# Patient Record
Sex: MALE | Race: WHITE | NOT HISPANIC OR LATINO
[De-identification: names, ages, dates, MRNs, and addresses within clinical notes are randomized per-mention and may not be internally consistent; named-entity substitution may affect disease eponyms.]

---

## 2017-09-14 ENCOUNTER — APPOINTMENT (OUTPATIENT)
Dept: CT IMAGING | Facility: CLINIC | Age: 62
End: 2017-09-14

## 2017-09-20 ENCOUNTER — OUTPATIENT (OUTPATIENT)
Dept: OUTPATIENT SERVICES | Facility: HOSPITAL | Age: 62
LOS: 1 days | End: 2017-09-20
Payer: COMMERCIAL

## 2017-09-20 ENCOUNTER — APPOINTMENT (OUTPATIENT)
Dept: RADIOLOGY | Facility: CLINIC | Age: 62
End: 2017-09-20
Payer: COMMERCIAL

## 2017-09-20 DIAGNOSIS — Z00.8 ENCOUNTER FOR OTHER GENERAL EXAMINATION: ICD-10-CM

## 2017-09-20 PROCEDURE — 27093 INJECTION FOR HIP X-RAY: CPT

## 2017-09-20 PROCEDURE — 27093 INJECTION FOR HIP X-RAY: CPT | Mod: LT

## 2017-09-20 PROCEDURE — 73525 CONTRAST X-RAY OF HIP: CPT | Mod: 26,LT

## 2017-09-20 PROCEDURE — 73525 CONTRAST X-RAY OF HIP: CPT

## 2018-09-13 ENCOUNTER — RESULT REVIEW (OUTPATIENT)
Age: 63
End: 2018-09-13

## 2019-01-07 ENCOUNTER — OUTPATIENT (OUTPATIENT)
Dept: OUTPATIENT SERVICES | Facility: HOSPITAL | Age: 64
LOS: 1 days | End: 2019-01-07
Payer: MEDICARE

## 2019-01-07 VITALS
HEIGHT: 70 IN | TEMPERATURE: 99 F | OXYGEN SATURATION: 98 % | RESPIRATION RATE: 16 BRPM | WEIGHT: 313.94 LBS | HEART RATE: 66 BPM | DIASTOLIC BLOOD PRESSURE: 84 MMHG | SYSTOLIC BLOOD PRESSURE: 138 MMHG

## 2019-01-07 DIAGNOSIS — G47.33 OBSTRUCTIVE SLEEP APNEA (ADULT) (PEDIATRIC): ICD-10-CM

## 2019-01-07 DIAGNOSIS — M71.341 OTHER BURSAL CYST, RIGHT HAND: ICD-10-CM

## 2019-01-07 DIAGNOSIS — T88.4XXA FAILED OR DIFFICULT INTUBATION, INITIAL ENCOUNTER: ICD-10-CM

## 2019-01-07 DIAGNOSIS — Z98.84 BARIATRIC SURGERY STATUS: Chronic | ICD-10-CM

## 2019-01-07 DIAGNOSIS — I10 ESSENTIAL (PRIMARY) HYPERTENSION: ICD-10-CM

## 2019-01-07 DIAGNOSIS — M71.30 OTHER BURSAL CYST, UNSPECIFIED SITE: ICD-10-CM

## 2019-01-07 DIAGNOSIS — Z98.890 OTHER SPECIFIED POSTPROCEDURAL STATES: Chronic | ICD-10-CM

## 2019-01-07 DIAGNOSIS — Z01.818 ENCOUNTER FOR OTHER PREPROCEDURAL EXAMINATION: ICD-10-CM

## 2019-01-07 LAB
ALBUMIN SERPL ELPH-MCNC: 3.5 G/DL — SIGNIFICANT CHANGE UP (ref 3.3–5)
ALP SERPL-CCNC: 83 U/L — SIGNIFICANT CHANGE UP (ref 40–120)
ALT FLD-CCNC: 35 U/L — SIGNIFICANT CHANGE UP (ref 12–78)
ANION GAP SERPL CALC-SCNC: 7 MMOL/L — SIGNIFICANT CHANGE UP (ref 5–17)
AST SERPL-CCNC: 24 U/L — SIGNIFICANT CHANGE UP (ref 15–37)
BILIRUB SERPL-MCNC: 1 MG/DL — SIGNIFICANT CHANGE UP (ref 0.2–1.2)
BUN SERPL-MCNC: 10 MG/DL — SIGNIFICANT CHANGE UP (ref 7–23)
CALCIUM SERPL-MCNC: 8.5 MG/DL — SIGNIFICANT CHANGE UP (ref 8.5–10.1)
CHLORIDE SERPL-SCNC: 104 MMOL/L — SIGNIFICANT CHANGE UP (ref 96–108)
CO2 SERPL-SCNC: 33 MMOL/L — HIGH (ref 22–31)
CREAT SERPL-MCNC: 0.86 MG/DL — SIGNIFICANT CHANGE UP (ref 0.5–1.3)
GLUCOSE SERPL-MCNC: 97 MG/DL — SIGNIFICANT CHANGE UP (ref 70–99)
HCT VFR BLD CALC: 49.6 % — SIGNIFICANT CHANGE UP (ref 39–50)
HGB BLD-MCNC: 16.2 G/DL — SIGNIFICANT CHANGE UP (ref 13–17)
MCHC RBC-ENTMCNC: 28.7 PG — SIGNIFICANT CHANGE UP (ref 27–34)
MCHC RBC-ENTMCNC: 32.7 GM/DL — SIGNIFICANT CHANGE UP (ref 32–36)
MCV RBC AUTO: 87.8 FL — SIGNIFICANT CHANGE UP (ref 80–100)
NRBC # BLD: 0 /100 WBCS — SIGNIFICANT CHANGE UP (ref 0–0)
PLATELET # BLD AUTO: 285 K/UL — SIGNIFICANT CHANGE UP (ref 150–400)
POTASSIUM SERPL-MCNC: 3.7 MMOL/L — SIGNIFICANT CHANGE UP (ref 3.5–5.3)
POTASSIUM SERPL-SCNC: 3.7 MMOL/L — SIGNIFICANT CHANGE UP (ref 3.5–5.3)
PROT SERPL-MCNC: 7.1 G/DL — SIGNIFICANT CHANGE UP (ref 6–8.3)
RBC # BLD: 5.65 M/UL — SIGNIFICANT CHANGE UP (ref 4.2–5.8)
RBC # FLD: 13.5 % — SIGNIFICANT CHANGE UP (ref 10.3–14.5)
SODIUM SERPL-SCNC: 144 MMOL/L — SIGNIFICANT CHANGE UP (ref 135–145)
WBC # BLD: 8.77 K/UL — SIGNIFICANT CHANGE UP (ref 3.8–10.5)
WBC # FLD AUTO: 8.77 K/UL — SIGNIFICANT CHANGE UP (ref 3.8–10.5)

## 2019-01-07 PROCEDURE — 93005 ELECTROCARDIOGRAM TRACING: CPT

## 2019-01-07 PROCEDURE — G0463: CPT

## 2019-01-07 PROCEDURE — 85027 COMPLETE CBC AUTOMATED: CPT

## 2019-01-07 PROCEDURE — 93010 ELECTROCARDIOGRAM REPORT: CPT | Mod: NC

## 2019-01-07 PROCEDURE — 36415 COLL VENOUS BLD VENIPUNCTURE: CPT

## 2019-01-07 PROCEDURE — 80053 COMPREHEN METABOLIC PANEL: CPT

## 2019-01-07 NOTE — H&P PST ADULT - ASSESSMENT
62 yo male scheduled for excision right index and middle finger mucous cyst w/ debridement of DIP joint biopsy of right middle fingernail lesion on 01/18/2019.

## 2019-01-07 NOTE — H&P PST ADULT - PMH
Bursal cyst  right hand  Chronic obstructive pulmonary disease, unspecified COPD type  stable- no exacerbation- Last pulmonary evaluation on 12/2018  Essential hypertension    Hypothyroidism, adult    Obesity  BMI 45  CELESTINO (obstructive sleep apnea)  CPAP advised - pt noncompliant Bursal cyst  right hand  Chronic obstructive pulmonary disease, unspecified COPD type  stable- no exacerbation- Last pulmonary evaluation on 12/2018  Difficult intubation    Essential hypertension    Hypothyroidism, adult    Obesity  BMI 45  CELESTINO (obstructive sleep apnea)  CPAP advised - pt noncompliant

## 2019-01-07 NOTE — H&P PST ADULT - PSH
History of nasal septoplasty  09/2018 History of laparoscopic adjustable gastric banding  Lap band- 2010  Deflated in 2015  History of nasal septoplasty  09/2018

## 2019-01-07 NOTE — PROGRESS NOTE ADULT - SUBJECTIVE AND OBJECTIVE BOX
Patient scheduled for elective procedure on two fingers by Dr. Maldonado.  Asked to evaluate secondary to possible difficult intubation.  Patient is morbidly obese patient, poor historian who describes two separate instances of failed procedure secondary to airway compromise, once during colonoscopy or possibly endoscopy and the second during a lap band procedure.  Patient also describes a secondary attempt at both procedures after what sounds like an inhalational intubation technique with some degree of IV sedation.  Patients anatomical exam reveals a Mallampati class 4 airway, short thick neck, poor dentition, small mouth opening, and big tongue.  Patient also has emphysema which may complicate intubation.  Recommend sedation and digital nerve block for this minor procedure or if intubation needed, then sedated fiberoptic intubation may be required.  Patient acknowledged understanding and agrees with anesthetic plan.

## 2019-01-07 NOTE — H&P PST ADULT - HISTORY OF PRESENT ILLNESS
64 yo male with h/o HTN, COPD, hypothyroidism, obesity c/o mucous cyst right index & middle finger x 6 months. Pt had surgical consult- scheduled for excision right index and middle finger mucous cyst w/ debridement of DIP joint biopsy of right middle fingernail lesion on 01/18/2019.    **Pt has s h/o difficult intun 62 yo male with h/o HTN, COPD, hypothyroidism, obesity c/o mucous cyst right index & middle finger x 6 months. Pt had surgical consult- scheduled for excision right index and middle finger mucous cyst w/ debridement of DIP joint biopsy of right middle fingernail lesion on 01/18/2019.    **Pt has s h/o difficult intubation, pt evaluated by Dr. Sosa in PST**

## 2019-01-07 NOTE — H&P PST ADULT - PROBLEM SELECTOR PLAN 1
excision right index and middle finger mucous cyst w/ debridement of DIP joint biopsy of right middle fingernail lesion   Labs- CBC, CMP, EKG  PMD evaluation on 01/10/2019 for M/C

## 2019-01-17 ENCOUNTER — TRANSCRIPTION ENCOUNTER (OUTPATIENT)
Age: 64
End: 2019-01-17

## 2019-01-18 ENCOUNTER — OUTPATIENT (OUTPATIENT)
Dept: OUTPATIENT SERVICES | Facility: HOSPITAL | Age: 64
LOS: 1 days | End: 2019-01-18
Payer: MEDICARE

## 2019-01-18 VITALS
TEMPERATURE: 98 F | RESPIRATION RATE: 16 BRPM | WEIGHT: 313.94 LBS | DIASTOLIC BLOOD PRESSURE: 79 MMHG | HEIGHT: 70 IN | SYSTOLIC BLOOD PRESSURE: 196 MMHG | OXYGEN SATURATION: 95 % | HEART RATE: 63 BPM

## 2019-01-18 VITALS
SYSTOLIC BLOOD PRESSURE: 132 MMHG | RESPIRATION RATE: 19 BRPM | DIASTOLIC BLOOD PRESSURE: 78 MMHG | HEART RATE: 79 BPM | OXYGEN SATURATION: 95 %

## 2019-01-18 DIAGNOSIS — Z01.818 ENCOUNTER FOR OTHER PREPROCEDURAL EXAMINATION: ICD-10-CM

## 2019-01-18 DIAGNOSIS — Z98.890 OTHER SPECIFIED POSTPROCEDURAL STATES: Chronic | ICD-10-CM

## 2019-01-18 DIAGNOSIS — M71.341 OTHER BURSAL CYST, RIGHT HAND: ICD-10-CM

## 2019-01-18 DIAGNOSIS — Z98.84 BARIATRIC SURGERY STATUS: Chronic | ICD-10-CM

## 2019-01-18 PROCEDURE — 26160 REMOVE TENDON SHEATH LESION: CPT | Mod: F8

## 2019-01-18 RX ORDER — CEFAZOLIN SODIUM 1 G
3000 VIAL (EA) INJECTION ONCE
Qty: 0 | Refills: 0 | Status: COMPLETED | OUTPATIENT
Start: 2019-01-18 | End: 2019-01-18

## 2019-01-18 RX ORDER — OXYCODONE HYDROCHLORIDE 5 MG/1
5 TABLET ORAL ONCE
Qty: 0 | Refills: 0 | Status: DISCONTINUED | OUTPATIENT
Start: 2019-01-18 | End: 2019-01-18

## 2019-01-18 RX ORDER — KETOROLAC TROMETHAMINE 30 MG/ML
15 SYRINGE (ML) INJECTION ONCE
Qty: 0 | Refills: 0 | Status: DISCONTINUED | OUTPATIENT
Start: 2019-01-18 | End: 2019-01-18

## 2019-01-18 RX ORDER — METOCLOPRAMIDE HCL 10 MG
5 TABLET ORAL ONCE
Qty: 0 | Refills: 0 | Status: DISCONTINUED | OUTPATIENT
Start: 2019-01-18 | End: 2019-01-18

## 2019-01-18 RX ORDER — ACETAMINOPHEN 500 MG
650 TABLET ORAL ONCE
Qty: 0 | Refills: 0 | Status: COMPLETED | OUTPATIENT
Start: 2019-01-18 | End: 2019-01-18

## 2019-01-18 RX ORDER — SODIUM CHLORIDE 9 MG/ML
1000 INJECTION, SOLUTION INTRAVENOUS
Qty: 0 | Refills: 0 | Status: DISCONTINUED | OUTPATIENT
Start: 2019-01-18 | End: 2019-01-18

## 2019-01-18 RX ORDER — HYDROMORPHONE HYDROCHLORIDE 2 MG/ML
0.5 INJECTION INTRAMUSCULAR; INTRAVENOUS; SUBCUTANEOUS
Qty: 0 | Refills: 0 | Status: DISCONTINUED | OUTPATIENT
Start: 2019-01-18 | End: 2019-01-18

## 2019-01-18 RX ADMIN — SODIUM CHLORIDE 40 MILLILITER(S): 9 INJECTION, SOLUTION INTRAVENOUS at 09:05

## 2019-01-18 RX ADMIN — Medication 15 MILLIGRAM(S): at 13:45

## 2019-01-18 RX ADMIN — Medication 650 MILLIGRAM(S): at 13:45

## 2019-01-18 RX ADMIN — SODIUM CHLORIDE 75 MILLILITER(S): 9 INJECTION, SOLUTION INTRAVENOUS at 13:31

## 2019-01-18 RX ADMIN — Medication 15 MILLIGRAM(S): at 13:33

## 2019-01-18 NOTE — ASU PATIENT PROFILE, ADULT - PSH
History of laparoscopic adjustable gastric banding  Lap band- 2010  Deflated in 2015  History of nasal septoplasty  09/2018

## 2019-01-18 NOTE — ASU DISCHARGE PLAN (ADULT/PEDIATRIC). - MEDICATION SUMMARY - MEDICATIONS TO TAKE
I will START or STAY ON the medications listed below when I get home from the hospital:    eplerenone 25 mg oral tablet  -- 1 tab(s) by mouth once a day  -- Indication: For blood pressure    quinapril 40 mg oral tablet  -- 1 tab(s) by mouth once a day  -- Indication: For blood pressure    atenolol 50 mg oral tablet  -- 1 tab(s) by mouth once a day  -- Indication: For blood pressure    amLODIPine 5 mg oral tablet  -- 1 tab(s) by mouth once a day  -- Indication: For blood pressure    Lasix 40 mg oral tablet  -- 1 tab(s) by mouth once a day  -- Indication: For blood pressure    Colace 100 mg oral capsule  -- 1  by mouth once a day  -- Indication: For laxative    Synthroid 200 mcg (0.2 mg) oral tablet  -- 1 tab(s) by mouth once a day  -- Indication: For hypothyroidism    Multiple Vitamins oral tablet  -- 1 tab(s) by mouth once a day last dose on 01/11/2019  -- Indication: For supplement

## 2019-01-18 NOTE — ASU DISCHARGE PLAN (ADULT/PEDIATRIC). - FOLLOWUP APPOINTMENT CLINIC/PHYSICIAN
Please follow up with Dr. Maldonado in the office next week.  If you do not already have an appointment, call the office to schedule one. [phone]

## 2019-01-18 NOTE — BRIEF OPERATIVE NOTE - PROCEDURE
<<-----Click on this checkbox to enter Procedure Debridement  01/18/2019  right index and middle finger DIP joints  Active  AMNA  Excision of mucous cyst of right index finger  01/18/2019    Active  AMNA  Excision of mucous cyst of right middle finger  01/18/2019    Active  AMAN

## 2019-01-18 NOTE — ASU PATIENT PROFILE, ADULT - PMH
Bursal cyst  right hand  Chronic obstructive pulmonary disease, unspecified COPD type  stable- no exacerbation- Last pulmonary evaluation on 12/2018  Difficult intubation    Essential hypertension    Hypothyroidism, adult    Obesity  BMI 45  CELESTINO (obstructive sleep apnea)  CPAP advised - pt noncompliant

## 2019-01-18 NOTE — ASU DISCHARGE PLAN (ADULT/PEDIATRIC). - NOTIFY
Fever greater than 101/Numbness, tingling/Bleeding that does not stop/Numbness, color, or temperature change to extremity/Pain not relieved by Medications Swelling that continues/Fever greater than 101/Numbness, tingling/Bleeding that does not stop/Numbness, color, or temperature change to extremity/Pain not relieved by Medications

## 2019-01-20 PROBLEM — J44.9 CHRONIC OBSTRUCTIVE PULMONARY DISEASE, UNSPECIFIED: Chronic | Status: ACTIVE | Noted: 2019-01-07

## 2019-01-20 PROBLEM — I10 ESSENTIAL (PRIMARY) HYPERTENSION: Chronic | Status: ACTIVE | Noted: 2019-01-07

## 2019-01-20 PROBLEM — T88.4XXA FAILED OR DIFFICULT INTUBATION, INITIAL ENCOUNTER: Chronic | Status: ACTIVE | Noted: 2019-01-07

## 2019-01-20 PROBLEM — M71.30 OTHER BURSAL CYST, UNSPECIFIED SITE: Chronic | Status: ACTIVE | Noted: 2019-01-07

## 2019-01-20 PROBLEM — E66.9 OBESITY, UNSPECIFIED: Chronic | Status: ACTIVE | Noted: 2019-01-07

## 2019-01-20 PROBLEM — E03.9 HYPOTHYROIDISM, UNSPECIFIED: Chronic | Status: ACTIVE | Noted: 2019-01-07

## 2019-01-20 PROBLEM — G47.33 OBSTRUCTIVE SLEEP APNEA (ADULT) (PEDIATRIC): Chronic | Status: ACTIVE | Noted: 2019-01-07

## 2019-03-04 ENCOUNTER — TRANSCRIPTION ENCOUNTER (OUTPATIENT)
Age: 64
End: 2019-03-04

## 2019-03-05 ENCOUNTER — RESULT REVIEW (OUTPATIENT)
Age: 64
End: 2019-03-05

## 2019-03-05 ENCOUNTER — OUTPATIENT (OUTPATIENT)
Dept: OUTPATIENT SERVICES | Facility: HOSPITAL | Age: 64
LOS: 1 days | End: 2019-03-05
Payer: MEDICARE

## 2019-03-05 DIAGNOSIS — Z12.11 ENCOUNTER FOR SCREENING FOR MALIGNANT NEOPLASM OF COLON: ICD-10-CM

## 2019-03-05 DIAGNOSIS — Z98.890 OTHER SPECIFIED POSTPROCEDURAL STATES: Chronic | ICD-10-CM

## 2019-03-05 DIAGNOSIS — Z98.84 BARIATRIC SURGERY STATUS: Chronic | ICD-10-CM

## 2019-03-05 PROCEDURE — 45385 COLONOSCOPY W/LESION REMOVAL: CPT | Mod: PT

## 2019-03-05 PROCEDURE — 88305 TISSUE EXAM BY PATHOLOGIST: CPT | Mod: 26

## 2019-03-05 PROCEDURE — 88305 TISSUE EXAM BY PATHOLOGIST: CPT

## 2019-03-06 LAB — SURGICAL PATHOLOGY STUDY: SIGNIFICANT CHANGE UP

## 2019-04-26 NOTE — H&P PST ADULT - MALLAMPATI CLASS
Verbal order and read back per PT INR HV CSI  Patient is within therapeutic range  continue Coumadin 5mg daily except 2.5mg on Tues, Thur, and Sat   Recheck 4 weeks  This has been fully explained to the patient, who indicates understanding. Class III - visualization of the soft palate and the base of the uvula Class IV (difficult) - the soft palate is not visible at all

## 2019-05-07 NOTE — ASU DISCHARGE PLAN (ADULT/PEDIATRIC). - BATHING
You may shower, but keep right hand dry Alert-The patient is alert, awake and responds to voice. The patient is oriented to time, place, and person. The triage nurse is able to obtain subjective information.

## 2019-10-08 ENCOUNTER — APPOINTMENT (OUTPATIENT)
Dept: CT IMAGING | Facility: IMAGING CENTER | Age: 64
End: 2019-10-08
Payer: MEDICARE

## 2019-10-08 ENCOUNTER — OUTPATIENT (OUTPATIENT)
Dept: OUTPATIENT SERVICES | Facility: HOSPITAL | Age: 64
LOS: 1 days | End: 2019-10-08
Payer: MEDICARE

## 2019-10-08 DIAGNOSIS — Z00.8 ENCOUNTER FOR OTHER GENERAL EXAMINATION: ICD-10-CM

## 2019-10-08 DIAGNOSIS — Z98.890 OTHER SPECIFIED POSTPROCEDURAL STATES: Chronic | ICD-10-CM

## 2019-10-08 DIAGNOSIS — Z98.84 BARIATRIC SURGERY STATUS: Chronic | ICD-10-CM

## 2019-10-08 PROCEDURE — 71250 CT THORAX DX C-: CPT

## 2019-10-08 PROCEDURE — 71250 CT THORAX DX C-: CPT | Mod: 26

## 2021-08-12 ENCOUNTER — EMERGENCY (EMERGENCY)
Facility: HOSPITAL | Age: 66
LOS: 1 days | Discharge: HOME | End: 2021-08-12
Attending: EMERGENCY MEDICINE | Admitting: EMERGENCY MEDICINE
Payer: MEDICARE

## 2021-08-12 VITALS
DIASTOLIC BLOOD PRESSURE: 82 MMHG | SYSTOLIC BLOOD PRESSURE: 126 MMHG | OXYGEN SATURATION: 95 % | RESPIRATION RATE: 15 BRPM | TEMPERATURE: 98 F | HEART RATE: 77 BPM

## 2021-08-12 VITALS
WEIGHT: 309.97 LBS | OXYGEN SATURATION: 97 % | DIASTOLIC BLOOD PRESSURE: 78 MMHG | TEMPERATURE: 98 F | SYSTOLIC BLOOD PRESSURE: 132 MMHG | HEART RATE: 63 BPM | HEIGHT: 70 IN | RESPIRATION RATE: 16 BRPM

## 2021-08-12 DIAGNOSIS — Z98.890 OTHER SPECIFIED POSTPROCEDURAL STATES: Chronic | ICD-10-CM

## 2021-08-12 DIAGNOSIS — Z98.84 BARIATRIC SURGERY STATUS: Chronic | ICD-10-CM

## 2021-08-12 LAB
ALBUMIN SERPL ELPH-MCNC: 3.5 G/DL — SIGNIFICANT CHANGE UP (ref 3.3–5)
ALP SERPL-CCNC: 78 U/L — SIGNIFICANT CHANGE UP (ref 40–120)
ALT FLD-CCNC: 33 U/L — SIGNIFICANT CHANGE UP (ref 12–78)
ANION GAP SERPL CALC-SCNC: 6 MMOL/L — SIGNIFICANT CHANGE UP (ref 5–17)
AST SERPL-CCNC: 29 U/L — SIGNIFICANT CHANGE UP (ref 15–37)
BASOPHILS # BLD AUTO: 0.07 K/UL — SIGNIFICANT CHANGE UP (ref 0–0.2)
BASOPHILS NFR BLD AUTO: 0.7 % — SIGNIFICANT CHANGE UP (ref 0–2)
BILIRUB SERPL-MCNC: 1 MG/DL — SIGNIFICANT CHANGE UP (ref 0.2–1.2)
BUN SERPL-MCNC: 30 MG/DL — HIGH (ref 7–23)
CALCIUM SERPL-MCNC: 9.5 MG/DL — SIGNIFICANT CHANGE UP (ref 8.5–10.1)
CHLORIDE SERPL-SCNC: 99 MMOL/L — SIGNIFICANT CHANGE UP (ref 96–108)
CK MB CFR SERPL CALC: <1 NG/ML — SIGNIFICANT CHANGE UP (ref 0–3.6)
CO2 SERPL-SCNC: 36 MMOL/L — HIGH (ref 22–31)
CREAT SERPL-MCNC: 1.4 MG/DL — HIGH (ref 0.5–1.3)
EOSINOPHIL # BLD AUTO: 0.33 K/UL — SIGNIFICANT CHANGE UP (ref 0–0.5)
EOSINOPHIL NFR BLD AUTO: 3.1 % — SIGNIFICANT CHANGE UP (ref 0–6)
GLUCOSE SERPL-MCNC: 94 MG/DL — SIGNIFICANT CHANGE UP (ref 70–99)
HCT VFR BLD CALC: 47.8 % — SIGNIFICANT CHANGE UP (ref 39–50)
HGB BLD-MCNC: 16.3 G/DL — SIGNIFICANT CHANGE UP (ref 13–17)
IMM GRANULOCYTES NFR BLD AUTO: 0.6 % — SIGNIFICANT CHANGE UP (ref 0–1.5)
LYMPHOCYTES # BLD AUTO: 0.92 K/UL — LOW (ref 1–3.3)
LYMPHOCYTES # BLD AUTO: 8.7 % — LOW (ref 13–44)
MAGNESIUM SERPL-MCNC: 2.2 MG/DL — SIGNIFICANT CHANGE UP (ref 1.6–2.6)
MCHC RBC-ENTMCNC: 30 PG — SIGNIFICANT CHANGE UP (ref 27–34)
MCHC RBC-ENTMCNC: 34.1 GM/DL — SIGNIFICANT CHANGE UP (ref 32–36)
MCV RBC AUTO: 87.9 FL — SIGNIFICANT CHANGE UP (ref 80–100)
MONOCYTES # BLD AUTO: 1.15 K/UL — HIGH (ref 0–0.9)
MONOCYTES NFR BLD AUTO: 10.8 % — SIGNIFICANT CHANGE UP (ref 2–14)
NEUTROPHILS # BLD AUTO: 8.1 K/UL — HIGH (ref 1.8–7.4)
NEUTROPHILS NFR BLD AUTO: 76.1 % — SIGNIFICANT CHANGE UP (ref 43–77)
NRBC # BLD: 0 /100 WBCS — SIGNIFICANT CHANGE UP (ref 0–0)
NT-PROBNP SERPL-SCNC: 67 PG/ML — SIGNIFICANT CHANGE UP (ref 0–125)
PLATELET # BLD AUTO: 227 K/UL — SIGNIFICANT CHANGE UP (ref 150–400)
POTASSIUM SERPL-MCNC: 3.8 MMOL/L — SIGNIFICANT CHANGE UP (ref 3.5–5.3)
POTASSIUM SERPL-SCNC: 3.8 MMOL/L — SIGNIFICANT CHANGE UP (ref 3.5–5.3)
PROT SERPL-MCNC: 7.5 G/DL — SIGNIFICANT CHANGE UP (ref 6–8.3)
RBC # BLD: 5.44 M/UL — SIGNIFICANT CHANGE UP (ref 4.2–5.8)
RBC # FLD: 13 % — SIGNIFICANT CHANGE UP (ref 10.3–14.5)
SODIUM SERPL-SCNC: 141 MMOL/L — SIGNIFICANT CHANGE UP (ref 135–145)
TROPONIN I SERPL-MCNC: <.015 NG/ML — SIGNIFICANT CHANGE UP (ref 0.01–0.04)
WBC # BLD: 10.63 K/UL — HIGH (ref 3.8–10.5)
WBC # FLD AUTO: 10.63 K/UL — HIGH (ref 3.8–10.5)

## 2021-08-12 PROCEDURE — 80053 COMPREHEN METABOLIC PANEL: CPT

## 2021-08-12 PROCEDURE — 83735 ASSAY OF MAGNESIUM: CPT

## 2021-08-12 PROCEDURE — 93010 ELECTROCARDIOGRAM REPORT: CPT

## 2021-08-12 PROCEDURE — 83880 ASSAY OF NATRIURETIC PEPTIDE: CPT

## 2021-08-12 PROCEDURE — 84484 ASSAY OF TROPONIN QUANT: CPT

## 2021-08-12 PROCEDURE — 82553 CREATINE MB FRACTION: CPT

## 2021-08-12 PROCEDURE — 36415 COLL VENOUS BLD VENIPUNCTURE: CPT

## 2021-08-12 PROCEDURE — 93005 ELECTROCARDIOGRAM TRACING: CPT

## 2021-08-12 PROCEDURE — 70450 CT HEAD/BRAIN W/O DYE: CPT | Mod: MA

## 2021-08-12 PROCEDURE — 96361 HYDRATE IV INFUSION ADD-ON: CPT

## 2021-08-12 PROCEDURE — 96360 HYDRATION IV INFUSION INIT: CPT

## 2021-08-12 PROCEDURE — 70450 CT HEAD/BRAIN W/O DYE: CPT | Mod: 26,MA

## 2021-08-12 PROCEDURE — 85025 COMPLETE CBC W/AUTO DIFF WBC: CPT

## 2021-08-12 PROCEDURE — 99284 EMERGENCY DEPT VISIT MOD MDM: CPT | Mod: 25

## 2021-08-12 PROCEDURE — 99285 EMERGENCY DEPT VISIT HI MDM: CPT | Mod: GC

## 2021-08-12 RX ORDER — AMLODIPINE BESYLATE 2.5 MG/1
1 TABLET ORAL
Qty: 0 | Refills: 0 | DISCHARGE

## 2021-08-12 RX ORDER — ATENOLOL 25 MG/1
1 TABLET ORAL
Qty: 0 | Refills: 0 | DISCHARGE

## 2021-08-12 RX ORDER — SODIUM CHLORIDE 9 MG/ML
1000 INJECTION INTRAMUSCULAR; INTRAVENOUS; SUBCUTANEOUS ONCE
Refills: 0 | Status: COMPLETED | OUTPATIENT
Start: 2021-08-12 | End: 2021-08-12

## 2021-08-12 RX ORDER — FUROSEMIDE 40 MG
1 TABLET ORAL
Qty: 0 | Refills: 0 | DISCHARGE

## 2021-08-12 RX ADMIN — SODIUM CHLORIDE 1000 MILLILITER(S): 9 INJECTION INTRAMUSCULAR; INTRAVENOUS; SUBCUTANEOUS at 19:23

## 2021-08-12 RX ADMIN — SODIUM CHLORIDE 1000 MILLILITER(S): 9 INJECTION INTRAMUSCULAR; INTRAVENOUS; SUBCUTANEOUS at 21:00

## 2021-08-12 NOTE — ED ADULT NURSE NOTE - NSICDXPASTMEDICALHX_GEN_ALL_CORE_FT
PAST MEDICAL HISTORY:  Bursal cyst right hand    Chronic obstructive pulmonary disease, unspecified COPD type stable- no exacerbation- Last pulmonary evaluation on 12/2018    Difficult intubation     Essential hypertension     Hypothyroidism, adult     Obesity BMI 45    CELESTINO (obstructive sleep apnea) CPAP advised - pt noncompliant

## 2021-08-12 NOTE — ED ADULT NURSE NOTE - OBJECTIVE STATEMENT
Witnessed Syncope at home with Spouse, reports that Pt had not eaten or drank anything all day, passed out in kitchen, pt reports having BP issues and was started on new medication recently, skin intact, EKG WNL, pedal edema +2 noted positive pedal pulses, Spouse reports that pt hit his head when he fell, no abrasions, safety maintained will continue to monitor

## 2021-08-12 NOTE — ED PROVIDER NOTE - NSFOLLOWUPINSTRUCTIONS_ED_ALL_ED_FT
Follow up with your primary care doctor and cardiologist.        WHAT YOU NEED TO KNOW:    Syncope is also called fainting or passing out. Syncope is a sudden, temporary loss of consciousness, followed by a fall from a standing or sitting position. Syncope ranges from not serious to a sign of a more serious condition that needs to be treated. You can control some health conditions that cause syncope. Your healthcare providers can help you create a plan to manage syncope and prevent episodes.    DISCHARGE INSTRUCTIONS:    Seek care immediately if:   •You are bleeding because you hit your head when you fainted.       •You suddenly have double vision, difficulty speaking, numbness, and cannot move your arms or legs.      •You have chest pain and trouble breathing.      •You vomit blood or material that looks like coffee grounds.      •You see blood in your bowel movement.      Contact your healthcare provider if:   •You have new or worsening symptoms.      •You have another syncope episode.      •You have a headache, fast heartbeat, or feel too dizzy to stand up.      •You have questions or concerns about your condition or care.      Medicines:   •Medicines may be needed to help your heart pump strongly and regularly. Your healthcare provider may also make changes to any medicines that are causing syncope.       •Take your medicine as directed. Contact your healthcare provider if you think your medicine is not helping or if you have side effects. Tell him or her if you are allergic to any medicine. Keep a list of the medicines, vitamins, and herbs you take. Include the amounts, and when and why you take them. Bring the list or the pill bottles to follow-up visits. Carry your medicine list with you in case of an emergency.      Follow up with your doctor as directed: Write down your questions so you remember to ask them during your visits.     Manage syncope:   •Keep a record of your syncope episodes. Include your symptoms and your activity before and after the episode. The record can help your healthcare provider find the cause of your syncope and help you manage episodes.      •Sit or lie down when needed. This includes when you feel dizzy, your throat is getting tight, and your vision changes. Raise your legs above the level of your heart.      •Take slow, deep breaths if you start to breathe faster with anxiety or fear. This can help decrease dizziness and the feeling that you might faint.       •Check your blood pressure often. This is important if you take medicine to lower your blood pressure. Check your blood pressure when you are lying down and when you are standing. Ask how often to check during the day. Keep a record of your blood pressure numbers. Your healthcare provider may use the record to help plan your treatment.  How to take a Blood Pressure           Prevent a syncope episode:   •Move slowly and let yourself get used to one position before you move to another position. This is very important when you change from a lying or sitting position to a standing position. Take some deep breaths before you stand up from a lying position. Stand up slowly. Sudden movements may cause a fainting spell. Sit on the side of the bed or couch for a few minutes before you stand up. If you are on bedrest, try to be upright for about 2 hours each day, or as directed. Do not lock your legs if you are standing for a long period of time. Move your legs and bend your knees to keep blood flowing.      •Follow your healthcare provider's recommendations. Your provider may recommend that you drink more liquids to prevent dehydration. You may also need to have more salt to keep your blood pressure from dropping too low and causing syncope. Your provider will tell you how much liquid and sodium to have each day. He or she will also tell you how much physical activity is safe for you. This will depend on what is causing your syncope.      •Watch for signs of low blood sugar. These include hunger, nervousness, sweating, and fast or fluttery heartbeats. Talk with your healthcare provider about ways to keep your blood sugar level steady.      •Do not strain if you are constipated. You may faint if you strain to have a bowel movement. Walking is the best way to get your bowels moving. Eat foods high in fiber to make it easier to have a bowel movement. Good examples are high-fiber cereals, beans, vegetables, and whole-grain breads. Prune juice may help make bowel movements softer.      •Be careful in hot weather. Heat can cause a syncope episode. Limit activity done outside on hot days. Physical activity in hot weather can lead to dehydration. This can cause an episode.         © Copyright [employer] 2021           back to top                          © Copyright [employer] 2021

## 2021-08-12 NOTE — ED PROVIDER NOTE - PROGRESS NOTE DETAILS
patient feeling well, ambulatory with out assitance, ekg, labs, ct head, reviwed, noted elevation of BUN and creatnine, to dc home, will f/u with PMD and cardiologist

## 2021-08-12 NOTE — ED PROVIDER NOTE - ATTENDING CONTRIBUTION TO CARE
66 male presents to ER for syncope, patient on metalozone, lasix, epreline for lower extremity edema, has not eaten or hydrated himself today, felt light headed and syncopized, hit head, patient alert and oriented, no acute distress, noted bilateral lower extremity edema, patient states his swelling has improved, heart and lungs clear, abdomen soft, f/u ct head, labs, ekg, re-eval.

## 2021-08-12 NOTE — ED ADULT TRIAGE NOTE - CHIEF COMPLAINT QUOTE
pt passed out for a second at home  was dizzy and lightheaded at time of event  denies dizziness or lightheadedness now, per wife the drs been changing blood pressure meds

## 2021-08-12 NOTE — ED PROVIDER NOTE - OBJECTIVE STATEMENT
Patient is a 65 yo male with PMH of HTN, COPD, obesity, CELESTINO, hypothyroidism. Presents to the ED after an episode of dizziness and syncope. Patient was at the kitchen when he felt dizzy and pass out. Her wife was around when heard the fall and was able to assist him. He was on the floor initially confused, but alert and was able to stand by himself with no symptoms or restrictions. After the episode, he has been asymptomatic. Patient has been for 12 days on new medication to control the BP - metolazone 5 mg QD- and does not check the BP at home. Denies chest pain, palpitations, chest pressure, headache, blurry vision, before or after the episode.   Vaccinated with Moderna x2 (last one 03/21). Patient is a 67 yo male with PMH of HTN, COPD, obesity, CELESTINO, hypothyroidism. Presents to the ED after an episode of syncope. Patient took his meds at 10 am and then was outside walking with his dog, doing some home shores and did not drink any fluids or eat any food during the day. In the afternoon, about 3pm he was standing at the kitchen when he felt lightheaded and pass out. Her wife was around when heard the fall and was able to assist him. He was on the floor initially confused, but alert and was able to stand by himself with no symptoms or restrictions for movement. He had a trauma in the right side of the head during the fall with no bleeding.  After the episode, he has been asymptomatic. Patient has been for 12 days on new medication to control the BP - metolazone 5 mg QD- and has not checked the BP at home. Denies chest pain, palpitations, chest pressure, headache, blurry vision, before or after the episode.   Vaccinated with Moderna x2 (last one 03/21).

## 2021-08-12 NOTE — ED ADULT NURSE NOTE - NSICDXPASTSURGICALHX_GEN_ALL_CORE_FT
PAST SURGICAL HISTORY:  History of laparoscopic adjustable gastric banding Lap band- 2010  Deflated in 2015    History of nasal septoplasty 09/2018

## 2021-08-12 NOTE — ED ADULT NURSE NOTE - NSIMPLEMENTINTERV_GEN_ALL_ED
Implemented All Fall with Harm Risk Interventions:  [place] to call system. Call bell, personal items and telephone within reach. Instruct patient to call for assistance. Room bathroom lighting operational. Non-slip footwear when patient is off stretcher. Physically safe environment: no spills, clutter or unnecessary equipment. Stretcher in lowest position, wheels locked, appropriate side rails in place. Provide visual cue, wrist band, yellow gown, etc. Monitor gait and stability. Monitor for mental status changes and reorient to person, place, and time. Review medications for side effects contributing to fall risk. Reinforce activity limits and safety measures with patient and family. Provide visual clues: red socks.

## 2021-08-12 NOTE — ED PROVIDER NOTE - CARE PROVIDER_API CALL
Hema Milner  INTERNAL MEDICINE  [address], [address]  [place] [address]  Phone: [phone]  Fax: [phone]  Follow Up Time:     Geovany Hodges)  Cardiovascular Disease  [address] [place] [address]  Phone: [phone]  Fax: [phone]  Follow Up Time:

## 2021-08-12 NOTE — ED PROVIDER NOTE - PATIENT PORTAL LINK FT
You can access the FollowMyHealth Patient Portal offered by [facility] by registering at the following website: [email or web address]. By joining [facility]’s FollowMyHealth portal, you will also be able to view your health information using other applications (apps) compatible with our system.

## 2022-04-04 ENCOUNTER — APPOINTMENT (OUTPATIENT)
Dept: ORTHOPEDIC SURGERY | Facility: CLINIC | Age: 67
End: 2022-04-04
Payer: MEDICARE

## 2022-04-04 PROCEDURE — 20611 DRAIN/INJ JOINT/BURSA W/US: CPT | Mod: LT

## 2022-04-11 ENCOUNTER — APPOINTMENT (OUTPATIENT)
Dept: ORTHOPEDIC SURGERY | Facility: CLINIC | Age: 67
End: 2022-04-11
Payer: MEDICARE

## 2022-04-11 PROCEDURE — 20610 DRAIN/INJ JOINT/BURSA W/O US: CPT

## 2022-04-11 NOTE — REASON FOR VISIT
[FreeTextEntry2] : 4/11/22: Orthovisc #4 left knee\par 4/4/22: Orthovisc #3 left knee\par 3/28/22- for continued orthovisc injections to the left knee\par 2/21/22: Patient presents for worsening L posterior knee pain after passing out in the kitchen 1 month ago. described as a dull ache. Denies associated swelling. Aggravated by walking, twisting motions, or prolonged standing. Slightly alleviated by Advil and heating pads. denies radiation. Denies prior injuries/surgeries/treatments for L knee. Denies associated clicking or giving out. Patient had a doppler US done on 2/16 as a routine surgery f/u, where he was told he may have a Baker's cyst. Denies calf pain.\par \par PMH: HTN. Denies DM, high cholesterol, or CA Hx\par \par PMH: retired gov

## 2022-04-11 NOTE — PROCEDURE
[Large Joint Injection] : Large joint injection [Knee] : knee [Left] : of the left [X-ray evidence of Osteoarthritis on this or prior visit] : x-ray evidence of Osteoarthritis on this or prior visit [Alcohol] : alcohol [Betadine] : betadine [Ethyl Chloride sprayed topically] : ethyl chloride sprayed topically [Orthovisc] : Orthovisc [#4] : series #4 [Call if redness, pain or fever occur] : call if redness, pain or fever occur [Apply ice for 15min out of every hour for the next 12-24 hours as tolerated] : apply ice for 15 minutes out of every hour for the next 12-24 hours as tolerated [Patient was advised to rest the joint(s) for ____ days] : patient was advised to rest the joint(s) for [unfilled] days [Previous OTC use and PT nontherapeutic] : patient has tried OTC's including aspirin, Ibuprofen, Aleve, etc or prescription NSAIDS, and/or exercises at home and/or physical therapy without satisfactory response [Patient had decreased mobility in the joint] : patient had decreased mobility in the joint [Risks, benefits, alternatives discussed / Verbal consent obtained] : the risks benefits, and alternatives have been discussed, and verbal consent was obtained

## 2022-04-12 ENCOUNTER — APPOINTMENT (OUTPATIENT)
Dept: ELECTROPHYSIOLOGY | Facility: CLINIC | Age: 67
End: 2022-04-12
Payer: MEDICARE

## 2022-04-12 ENCOUNTER — NON-APPOINTMENT (OUTPATIENT)
Age: 67
End: 2022-04-12

## 2022-04-12 VITALS
DIASTOLIC BLOOD PRESSURE: 80 MMHG | HEART RATE: 78 BPM | SYSTOLIC BLOOD PRESSURE: 132 MMHG | OXYGEN SATURATION: 94 % | HEIGHT: 70 IN | BODY MASS INDEX: 44.09 KG/M2 | WEIGHT: 308 LBS

## 2022-04-12 DIAGNOSIS — Z86.79 PERSONAL HISTORY OF OTHER DISEASES OF THE CIRCULATORY SYSTEM: ICD-10-CM

## 2022-04-12 DIAGNOSIS — Z78.9 OTHER SPECIFIED HEALTH STATUS: ICD-10-CM

## 2022-04-12 DIAGNOSIS — R00.2 PALPITATIONS: ICD-10-CM

## 2022-04-12 DIAGNOSIS — R55 SYNCOPE AND COLLAPSE: ICD-10-CM

## 2022-04-12 PROCEDURE — 99204 OFFICE O/P NEW MOD 45 MIN: CPT

## 2022-04-12 PROCEDURE — 93000 ELECTROCARDIOGRAM COMPLETE: CPT

## 2022-04-12 RX ORDER — DOXAZOSIN 8 MG/1
8 TABLET ORAL
Qty: 90 | Refills: 0 | Status: ACTIVE | COMMUNITY
Start: 2021-06-24

## 2022-04-14 ENCOUNTER — NON-APPOINTMENT (OUTPATIENT)
Age: 67
End: 2022-04-14

## 2022-04-16 PROBLEM — R55 SYNCOPE AND COLLAPSE: Status: RESOLVED | Noted: 2022-04-16 | Resolved: 2022-04-16

## 2022-04-16 PROBLEM — Z78.9 NO FAMILY HISTORY OF SUDDEN DEATH: Status: ACTIVE | Noted: 2022-04-16

## 2022-04-16 PROBLEM — Z86.79 HISTORY OF HYPERTENSION: Status: RESOLVED | Noted: 2022-04-16 | Resolved: 2022-04-16

## 2022-04-16 PROBLEM — Z78.9 NON-SMOKER: Status: ACTIVE | Noted: 2022-04-16

## 2022-04-16 RX ORDER — EPLERENONE 50 MG/1
50 TABLET, COATED ORAL DAILY
Refills: 0 | Status: ACTIVE | COMMUNITY
Start: 2022-04-16

## 2022-04-16 RX ORDER — POTASSIUM CHLORIDE 750 MG/1
10 TABLET, EXTENDED RELEASE ORAL DAILY
Refills: 0 | Status: ACTIVE | COMMUNITY
Start: 2022-04-16

## 2022-04-16 NOTE — PHYSICAL EXAM
[Well Nourished] : well nourished [No Acute Distress] : no acute distress [Obese] : obese [Normal Conjunctiva] : normal conjunctiva [Normal Venous Pressure] : normal venous pressure [Normal S1, S2] : normal S1, S2 [No Murmur] : no murmur [No Rub] : no rub [No Gallop] : no gallop [Clear Lung Fields] : clear lung fields [Good Air Entry] : good air entry [No Respiratory Distress] : no respiratory distress  [Soft] : abdomen soft [Non Tender] : non-tender [Normal Bowel Sounds] : normal bowel sounds [Normal Gait] : normal gait [No Edema] : no edema [No Cyanosis] : no cyanosis [No Rash] : no rash [Moves all extremities] : moves all extremities [No Focal Deficits] : no focal deficits [Normal Speech] : normal speech [Alert and Oriented] : alert and oriented [Normal memory] : normal memory

## 2022-04-18 NOTE — HISTORY OF PRESENT ILLNESS
[FreeTextEntry1] : I had the pleasure of seeing Asael Brandt today for consultation for pacemaker evaluation in the arrhythmia clinic at [facility]. As you well know, he is a pleasant 67 year old gentleman with a history of hypertension, hyperlipidemia, obesity, baseline right bundle branch block with left axis, syncope/near syncope and event monitor showing multiple long pauses up to 6.4 seconds. Patient reports that several months ago he was in his kitchen. He was standing when he suddenly syncopized. His wife heard a noise and went to kitchen and saw he was on the floor. By the time she was went to the kitchen he was already regaining consciousness. He denies any prodrome and denies chest pain, palpitations, diaphoresis, or shortness of breath. At the hospital, he was told he was dehydrated and work up at the hospital was negative. Subsequently since then he has been having episodes described as "zoning out". His wife states that at times he would appear to space out very briefly. She was call his name and he would not respond for a few seconds, and then all of a sudden he would shutter and become alert. Patient states that during those times he has a odd feeling when he spaces out and then suddenly comes to. He had a MCOT monitor placed which showed multipel long pauses over 6 seconds with longest about 6.4 seconds. These pauses occurred mainly during the day. As per the patient and his wife, he was not sleeping during many of these long pauses as they were able to correlate what they were doing at the time. However, they could not recall if he had symptoms during those events. He was on atenolol but this was discontinued based on the MCOT findings.\par

## 2022-04-18 NOTE — DISCUSSION/SUMMARY
[FreeTextEntry1] : In summary, Mr. Brandt is a 67 year old gentleman with a history of hypertension, hyperlipidemia, obesity, baseline right bundle branch block with left axis, syncope with subsequent "spacing out" episodes and event monitor showing multiple long pauses up to 6.4 seconds during wake hours. He would benefit from a pacemaker given his underlying conduction disease (bifascicular block), significant daytime pauses and syncope/near syncope. We discussed the role of a pacemaker including the procedure and procedure risks. We also discussed that the patient should not drive given these findings until he remains stable post his pacemaker implant. After all questions were answered, patient would like to proceed with a pacemaker implant. We will arrange for his procedure to be scheduled.\par

## 2022-04-18 NOTE — CARDIOLOGY SUMMARY
[de-identified] : today:\par SR @ 76 bpm; RBBB/left axis [de-identified] : 2 week MCOT monitor (3/17-3/31/22):\par SR between  bpm with avg HR 67 bpm\par 1 NSVT: 5 beats @ 102 bpm\par 1 episode of AT @ 3 beats\par 84 pauses up to 6.4 seconds\par episode of nocturnal 2:1 AVB [de-identified] : 12/21/2020:\par EF 60-65%\par Moderate concentric LVH\par Mild MR/TR

## 2022-04-21 ENCOUNTER — NON-APPOINTMENT (OUTPATIENT)
Age: 67
End: 2022-04-21

## 2022-04-25 ENCOUNTER — TRANSCRIPTION ENCOUNTER (OUTPATIENT)
Age: 67
End: 2022-04-25

## 2022-04-25 ENCOUNTER — OUTPATIENT (OUTPATIENT)
Dept: INPATIENT UNIT | Facility: HOSPITAL | Age: 67
LOS: 1 days | End: 2022-04-25
Payer: MEDICARE

## 2022-04-25 VITALS
WEIGHT: 309.97 LBS | SYSTOLIC BLOOD PRESSURE: 129 MMHG | RESPIRATION RATE: 18 BRPM | TEMPERATURE: 99 F | HEART RATE: 76 BPM | HEIGHT: 70 IN | OXYGEN SATURATION: 94 % | DIASTOLIC BLOOD PRESSURE: 73 MMHG

## 2022-04-25 VITALS — DIASTOLIC BLOOD PRESSURE: 45 MMHG | HEART RATE: 81 BPM | OXYGEN SATURATION: 94 % | SYSTOLIC BLOOD PRESSURE: 111 MMHG

## 2022-04-25 DIAGNOSIS — R00.1 BRADYCARDIA, UNSPECIFIED: ICD-10-CM

## 2022-04-25 DIAGNOSIS — Z98.84 BARIATRIC SURGERY STATUS: Chronic | ICD-10-CM

## 2022-04-25 DIAGNOSIS — Z98.890 OTHER SPECIFIED POSTPROCEDURAL STATES: Chronic | ICD-10-CM

## 2022-04-25 LAB
ANION GAP SERPL CALC-SCNC: 13 MMOL/L — SIGNIFICANT CHANGE UP (ref 5–17)
BLD GP AB SCN SERPL QL: NEGATIVE — SIGNIFICANT CHANGE UP
BUN SERPL-MCNC: 21 MG/DL — SIGNIFICANT CHANGE UP (ref 7–23)
CALCIUM SERPL-MCNC: 9.8 MG/DL — SIGNIFICANT CHANGE UP (ref 8.4–10.5)
CHLORIDE SERPL-SCNC: 98 MMOL/L — SIGNIFICANT CHANGE UP (ref 96–108)
CO2 SERPL-SCNC: 31 MMOL/L — SIGNIFICANT CHANGE UP (ref 22–31)
CREAT SERPL-MCNC: 1.27 MG/DL — SIGNIFICANT CHANGE UP (ref 0.5–1.3)
EGFR: 61 ML/MIN/1.73M2 — SIGNIFICANT CHANGE UP
GLUCOSE SERPL-MCNC: 94 MG/DL — SIGNIFICANT CHANGE UP (ref 70–99)
HCT VFR BLD CALC: 42.5 % — SIGNIFICANT CHANGE UP (ref 39–50)
HGB BLD-MCNC: 14.1 G/DL — SIGNIFICANT CHANGE UP (ref 13–17)
INR BLD: 1.06 RATIO — SIGNIFICANT CHANGE UP (ref 0.88–1.16)
MCHC RBC-ENTMCNC: 30.5 PG — SIGNIFICANT CHANGE UP (ref 27–34)
MCHC RBC-ENTMCNC: 33.2 GM/DL — SIGNIFICANT CHANGE UP (ref 32–36)
MCV RBC AUTO: 91.8 FL — SIGNIFICANT CHANGE UP (ref 80–100)
NRBC # BLD: 0 /100 WBCS — SIGNIFICANT CHANGE UP (ref 0–0)
PLATELET # BLD AUTO: 215 K/UL — SIGNIFICANT CHANGE UP (ref 150–400)
POTASSIUM SERPL-MCNC: 3.8 MMOL/L — SIGNIFICANT CHANGE UP (ref 3.5–5.3)
POTASSIUM SERPL-SCNC: 3.8 MMOL/L — SIGNIFICANT CHANGE UP (ref 3.5–5.3)
PROTHROM AB SERPL-ACNC: 12.3 SEC — SIGNIFICANT CHANGE UP (ref 10.5–13.4)
RBC # BLD: 4.63 M/UL — SIGNIFICANT CHANGE UP (ref 4.2–5.8)
RBC # FLD: 13.8 % — SIGNIFICANT CHANGE UP (ref 10.3–14.5)
RH IG SCN BLD-IMP: POSITIVE — SIGNIFICANT CHANGE UP
SODIUM SERPL-SCNC: 142 MMOL/L — SIGNIFICANT CHANGE UP (ref 135–145)
WBC # BLD: 8.99 K/UL — SIGNIFICANT CHANGE UP (ref 3.8–10.5)
WBC # FLD AUTO: 8.99 K/UL — SIGNIFICANT CHANGE UP (ref 3.8–10.5)

## 2022-04-25 PROCEDURE — C1785: CPT

## 2022-04-25 PROCEDURE — C1892: CPT

## 2022-04-25 PROCEDURE — 86901 BLOOD TYPING SEROLOGIC RH(D): CPT

## 2022-04-25 PROCEDURE — C1898: CPT

## 2022-04-25 PROCEDURE — 80048 BASIC METABOLIC PNL TOTAL CA: CPT

## 2022-04-25 PROCEDURE — 85027 COMPLETE CBC AUTOMATED: CPT

## 2022-04-25 PROCEDURE — 71045 X-RAY EXAM CHEST 1 VIEW: CPT | Mod: 26

## 2022-04-25 PROCEDURE — 86850 RBC ANTIBODY SCREEN: CPT

## 2022-04-25 PROCEDURE — 85610 PROTHROMBIN TIME: CPT

## 2022-04-25 PROCEDURE — 36415 COLL VENOUS BLD VENIPUNCTURE: CPT

## 2022-04-25 PROCEDURE — 93005 ELECTROCARDIOGRAM TRACING: CPT

## 2022-04-25 PROCEDURE — 33208 INSRT HEART PM ATRIAL & VENT: CPT

## 2022-04-25 PROCEDURE — 86900 BLOOD TYPING SEROLOGIC ABO: CPT

## 2022-04-25 PROCEDURE — 71045 X-RAY EXAM CHEST 1 VIEW: CPT

## 2022-04-25 RX ORDER — CEPHALEXIN 500 MG
1 CAPSULE ORAL
Qty: 0 | Refills: 0 | DISCHARGE
Start: 2022-04-25

## 2022-04-25 RX ORDER — EPLERENONE 50 MG/1
1 TABLET, FILM COATED ORAL
Qty: 0 | Refills: 0 | DISCHARGE

## 2022-04-25 RX ORDER — ACETAMINOPHEN 500 MG
2 TABLET ORAL
Qty: 0 | Refills: 0 | DISCHARGE
Start: 2022-04-25

## 2022-04-25 RX ORDER — DOCUSATE SODIUM 100 MG
1 CAPSULE ORAL
Qty: 0 | Refills: 0 | DISCHARGE

## 2022-04-25 RX ORDER — CEPHALEXIN 500 MG
500 CAPSULE ORAL EVERY 8 HOURS
Refills: 0 | Status: DISCONTINUED | OUTPATIENT
Start: 2022-04-25 | End: 2022-04-25

## 2022-04-25 RX ORDER — QUINAPRIL HYDROCHLORIDE 40 MG/1
1 TABLET, FILM COATED ORAL
Qty: 0 | Refills: 0 | DISCHARGE

## 2022-04-25 RX ORDER — CHLORHEXIDINE GLUCONATE 213 G/1000ML
1 SOLUTION TOPICAL ONCE
Refills: 0 | Status: COMPLETED | OUTPATIENT
Start: 2022-04-25 | End: 2022-04-25

## 2022-04-25 RX ORDER — LEVOTHYROXINE SODIUM 125 MCG
1 TABLET ORAL
Qty: 0 | Refills: 0 | DISCHARGE

## 2022-04-25 RX ORDER — ATENOLOL 25 MG/1
1 TABLET ORAL
Qty: 0 | Refills: 0 | DISCHARGE

## 2022-04-25 RX ORDER — FUROSEMIDE 40 MG
40 TABLET ORAL
Qty: 0 | Refills: 0 | DISCHARGE

## 2022-04-25 RX ORDER — OXYCODONE AND ACETAMINOPHEN 5; 325 MG/1; MG/1
1 TABLET ORAL EVERY 6 HOURS
Refills: 0 | Status: DISCONTINUED | OUTPATIENT
Start: 2022-04-25 | End: 2022-04-25

## 2022-04-25 RX ORDER — ACETAMINOPHEN 500 MG
1000 TABLET ORAL ONCE
Refills: 0 | Status: DISCONTINUED | OUTPATIENT
Start: 2022-04-25 | End: 2022-04-25

## 2022-04-25 RX ORDER — CEPHALEXIN 500 MG
1 CAPSULE ORAL
Qty: 9 | Refills: 0
Start: 2022-04-25 | End: 2022-04-27

## 2022-04-25 RX ADMIN — CHLORHEXIDINE GLUCONATE 1 APPLICATION(S): 213 SOLUTION TOPICAL at 10:00

## 2022-04-25 NOTE — PRE-ANESTHESIA EVALUATION ADULT - NSANTHPMHFT_GEN_ALL_CORE
Obesity, HTN, COPD, hypothyroid, CELESTINO not on CPAP, difficult airway (required awake fiberoptic for adenoid surgery)

## 2022-04-25 NOTE — ASU DISCHARGE PLAN (ADULT/PEDIATRIC) - NS MD DC FALL RISK RISK
For information on Fall & Injury Prevention, visit: [email or web address] OR  [email or web address] OR  https://www.cdc.gov/steadi/patient.html

## 2022-04-25 NOTE — ASU DISCHARGE PLAN (ADULT/PEDIATRIC) - ASU DC SPECIAL INSTRUCTIONSFT
WOUND CARE:  Do NOT scrub, rub, or pick at your incision site  AFTER 3 DAYS you may SHOWER  - use mild soap and gentle warm, water stream, pat dry  DO NOT apply lotions, creams, ointments, powder, perfumes to your incision site  DO NOT SOAK your site for 4-6 weeks ( no baths, no pools, no tubs, etc...)  wear loose clothing around site for 1-2 weeks  IF surgical tape was used DO NOT remove the strips, they will fall off after 7days, if glue was used, it will naturally fall off within 3 weeks  if staples were used, they will be removed in 7-10 days by your doctor    ACTIVITY:  for 2 weeks AFTER  your procedure  - DO NOT RAISE your arm above shoulder level ( on the same side of your incision)  for 4 weeks AFTER your procedure   - DO NOT LIFT anything 10 lbs or heavier ( on the side of your implant)   - certain activities may be limited longer, those that involve swinging your arm, and will be discussed with your EP doctor  DO NOT DRIVE until your EP Doctor or nurse practitioner/ physician assistant states it is safe to do so  A follow up appointment in 7-14 days will be arranged before your discharge    ID CARD:   you will receive an ID CARD and device company booklet   - please carry that card with you at all times    ***CALL YOUR DOCTOR ***  IF you have fever, chills, body aches, or severe pain, swelling, redness, heat, yellow drainage from your incision site  IF bleeding  or significant new swelling from your puncture site  IF your experience lightheadedness, dizziness, or fainting spell.  IF unable to ge tin contact with your doctor, you may call the Cardiology Office at [facility] at [phone]

## 2022-04-25 NOTE — H&P CARDIOLOGY - HISTORY OF PRESENT ILLNESS
This is a 67  male with no known implantable devices noted COVID 19 negative Vaccinated with Moderna with Booster last dose April 2022 with PMHX  h/o HTN, COPD, hypothyroidism,s/p lap band,  obesity c/o mucous cyst right index & middle finger. Presents to Dr. Hodges for symptomatic Bradycardia . Pt had recent episodes of lightheadedness and dizziness "feeling of fainting" . Referred to Dr. Bello for PPM implant today . Currently CP free no sob no dizziness no lightheadedness noted .   Cardiologist Dr. Hodges

## 2022-04-25 NOTE — PRE-ANESTHESIA EVALUATION ADULT - NSANTHVITALSIGNSFT_GEN_ALL_CORE
Vital Signs Last 24 Hrs  T(C): 37 (25 Apr 2022 10:28), Max: 37 (25 Apr 2022 09:57)  T(F): 98.6 (25 Apr 2022 10:28), Max: 98.6 (25 Apr 2022 09:57)  HR: 76 (25 Apr 2022 10:28) (76 - 76)  BP: 129/73 (25 Apr 2022 10:28) (129/73 - 129/73)  BP(mean): 91 (25 Apr 2022 10:28) (91 - 91)  RR: 16 (25 Apr 2022 10:28) (16 - 18)  SpO2: 94% (25 Apr 2022 10:28) (94% - 94%)

## 2022-04-25 NOTE — CHART NOTE - NSCHARTNOTEFT_GEN_A_CORE
4/25 patient s/p dual chamber PPM implant (for 3rd deg HB) via LACW with Dr Bello.  LACW PPM site benign, closed with Dermabond, open to air.  DDD , post procedure EKG A-sensed V-paced at 61bpm CXR ordered to be performed prior discharge today.  Pt s/p 3g Ancef.  Scripts for Keflex 500mg TID for 3 days and 1 tab percocet q6hrs for 3 days sent to [facility].  Meds to be picked up prior discharge.  Wound check appt made for May 9th at 8:40am.    Vitals:  T(C): 36.9 (25 Apr 2022 15:30), Max: 37 (25 Apr 2022 09:57)  T(F): 98.4 (25 Apr 2022 15:30), Max: 98.6 (25 Apr 2022 09:57)  HR: 70 (25 Apr 2022 16:00) (66 - 76)  BP: 147/66 (25 Apr 2022 16:00) (121/69 - 147/66)  BP(mean): 91 (25 Apr 2022 10:28) (91 - 91)  RR: 17 (25 Apr 2022 16:00) (15 - 18)  SpO2: 96% (25 Apr 2022 16:00) (93% - 96%)      Janki Valenzuela NP  [phone]

## 2022-04-26 ENCOUNTER — NON-APPOINTMENT (OUTPATIENT)
Age: 67
End: 2022-04-26

## 2022-04-28 RX ORDER — LEVOTHYROXINE SODIUM 125 MCG
1 TABLET ORAL
Qty: 0 | Refills: 0 | DISCHARGE

## 2022-04-28 RX ORDER — EPLERENONE 50 MG/1
2 TABLET, FILM COATED ORAL
Qty: 0 | Refills: 0 | DISCHARGE

## 2022-04-28 RX ORDER — FUROSEMIDE 40 MG
40 TABLET ORAL
Qty: 0 | Refills: 0 | DISCHARGE

## 2022-05-09 ENCOUNTER — APPOINTMENT (OUTPATIENT)
Dept: ELECTROPHYSIOLOGY | Facility: CLINIC | Age: 67
End: 2022-05-09
Payer: MEDICARE

## 2022-05-09 VITALS
BODY MASS INDEX: 44.38 KG/M2 | OXYGEN SATURATION: 95 % | HEIGHT: 70 IN | HEART RATE: 74 BPM | SYSTOLIC BLOOD PRESSURE: 116 MMHG | WEIGHT: 310 LBS | DIASTOLIC BLOOD PRESSURE: 75 MMHG

## 2022-05-09 PROCEDURE — 93000 ELECTROCARDIOGRAM COMPLETE: CPT | Mod: 59

## 2022-05-09 PROCEDURE — 99024 POSTOP FOLLOW-UP VISIT: CPT

## 2022-05-09 PROCEDURE — 93280 PM DEVICE PROGR EVAL DUAL: CPT

## 2022-05-11 ENCOUNTER — NON-APPOINTMENT (OUTPATIENT)
Age: 67
End: 2022-05-11

## 2022-05-23 ENCOUNTER — APPOINTMENT (OUTPATIENT)
Dept: ORTHOPEDIC SURGERY | Facility: CLINIC | Age: 67
End: 2022-05-23

## 2022-05-31 ENCOUNTER — APPOINTMENT (OUTPATIENT)
Dept: ORTHOPEDIC SURGERY | Facility: CLINIC | Age: 67
End: 2022-05-31
Payer: MEDICARE

## 2022-05-31 VITALS — HEIGHT: 70 IN | BODY MASS INDEX: 44.38 KG/M2 | WEIGHT: 310 LBS

## 2022-05-31 PROCEDURE — 20610 DRAIN/INJ JOINT/BURSA W/O US: CPT

## 2022-05-31 PROCEDURE — 99213 OFFICE O/P EST LOW 20 MIN: CPT | Mod: 25

## 2022-05-31 NOTE — PHYSICAL EXAM
[Left] : left knee [5___] : hamstring 5[unfilled]/5 [Equivocal] : equivocal Mj [] : negative Lachmann [TWNoteComboBox7] : flexion 120 degrees [de-identified] : extension 0 degrees

## 2022-05-31 NOTE — REASON FOR VISIT
[FreeTextEntry2] : \par 4/4/22: Orthovisc #3 left knee\par 3/28/22- for continued orthovisc injections to the left knee\par 2/21/22: Patient presents for worsening L posterior knee pain after passing out in the kitchen 1 month ago. described as a dull ache. Denies associated swelling. Aggravated by walking, twisting motions, or prolonged standing. Slightly alleviated by Advil and heating pads. denies radiation. Denies prior injuries/surgeries/treatments for L knee. Denies associated clicking or giving out. Patient had a doppler US done on 2/16 as a routine surgery f/u, where he was told he may have a Baker's cyst. Denies calf pain.\par \par PMH: HTN. Denies DM, high cholesterol, or CA Hx\par left knee pain\par

## 2022-05-31 NOTE — PROCEDURE
[Orthovisc] : Orthovisc [#4] : series #4 [Large Joint Injection] : Large joint injection [Left] : of the left [Knee] : knee [X-ray evidence of Osteoarthritis on this or prior visit] : x-ray evidence of Osteoarthritis on this or prior visit [Alcohol] : alcohol [Betadine] : betadine [Ethyl Chloride sprayed topically] : ethyl chloride sprayed topically [___ cc    3mg] :  Betamethasone (Celestone) ~Vcc of 3mg [___ cc    1%] : Lidocaine ~Vcc of 1%  [Call if redness, pain or fever occur] : call if redness, pain or fever occur [Apply ice for 15min out of every hour for the next 12-24 hours as tolerated] : apply ice for 15 minutes out of every hour for the next 12-24 hours as tolerated [Patient was advised to rest the joint(s) for ____ days] : patient was advised to rest the joint(s) for [unfilled] days [Previous OTC use and PT nontherapeutic] : patient has tried OTC's including aspirin, Ibuprofen, Aleve, etc or prescription NSAIDS, and/or exercises at home and/or physical therapy without satisfactory response [Patient had decreased mobility in the joint] : patient had decreased mobility in the joint [Risks, benefits, alternatives discussed / Verbal consent obtained] : the risks benefits, and alternatives have been discussed, and verbal consent was obtained

## 2022-05-31 NOTE — HISTORY OF PRESENT ILLNESS
[Gradual] : gradual [7] : 7 [Dull/Aching] : dull/aching [Localized] : localized [Sharp] : sharp [Intermittent] : intermittent [Sitting] : sitting [Walking] : walking [Stairs] : stairs [4] : 4 [Orthovisc] : Orthovisc [] : no [FreeTextEntry1] : Left Knee  [FreeTextEntry5] : Pt present for left knee pain follow up, stated that he is feeling the same since his last visit.\par Pt took some tramadol which he stated that he would like to get a script for a higher mg [FreeTextEntry9] : Ibuprofen  [de-identified] : 5-31-22- completed orthovisc left knee 4/11/22. [de-identified] : 4/4/22 [de-identified] : left knee

## 2022-05-31 NOTE — PHYSICAL EXAM
[Left] : left knee [5___] : hamstring 5[unfilled]/5 [Equivocal] : equivocal Mj [] : negative Lachmann [TWNoteComboBox7] : flexion 120 degrees [de-identified] : extension 0 degrees

## 2022-05-31 NOTE — HISTORY OF PRESENT ILLNESS
[Gradual] : gradual [7] : 7 [Dull/Aching] : dull/aching [Localized] : localized [Sharp] : sharp [Intermittent] : intermittent [Sitting] : sitting [Walking] : walking [Stairs] : stairs [4] : 4 [Orthovisc] : Orthovisc [] : no [FreeTextEntry1] : Left Knee  [FreeTextEntry5] : Pt present for left knee pain follow up, stated that he is feeling the same since his last visit.\par Pt took some tramadol which he stated that he would like to get a script for a higher mg [FreeTextEntry9] : Ibuprofen  [de-identified] : 5-31-22- completed orthovisc left knee 4/11/22. [de-identified] : 4/4/22 [de-identified] : left knee

## 2022-08-02 ENCOUNTER — APPOINTMENT (OUTPATIENT)
Dept: ELECTROPHYSIOLOGY | Facility: CLINIC | Age: 67
End: 2022-08-02

## 2022-08-02 ENCOUNTER — NON-APPOINTMENT (OUTPATIENT)
Age: 67
End: 2022-08-02

## 2022-08-02 VITALS
HEIGHT: 70 IN | DIASTOLIC BLOOD PRESSURE: 72 MMHG | SYSTOLIC BLOOD PRESSURE: 109 MMHG | HEART RATE: 79 BPM | WEIGHT: 305 LBS | OXYGEN SATURATION: 93 % | BODY MASS INDEX: 43.67 KG/M2 | RESPIRATION RATE: 14 BRPM

## 2022-08-02 PROCEDURE — 99213 OFFICE O/P EST LOW 20 MIN: CPT

## 2022-08-02 PROCEDURE — 93280 PM DEVICE PROGR EVAL DUAL: CPT

## 2022-08-02 RX ORDER — ATENOLOL 25 MG/1
25 TABLET ORAL
Qty: 90 | Refills: 0 | Status: ACTIVE | COMMUNITY
Start: 2022-06-30

## 2022-08-02 RX ORDER — DOCUSATE SODIUM 100 MG/1
100 CAPSULE, LIQUID FILLED ORAL
Refills: 0 | Status: ACTIVE | COMMUNITY

## 2022-08-02 RX ORDER — NYSTATIN 100000 1/G
100000 POWDER TOPICAL
Qty: 120 | Refills: 0 | Status: ACTIVE | COMMUNITY
Start: 2021-12-03

## 2022-08-02 RX ORDER — MV-MIN/FOLIC/K1/LYCOPEN/LUTEIN 300-60 MCG
TABLET ORAL
Refills: 0 | Status: ACTIVE | COMMUNITY

## 2022-08-02 RX ORDER — OXYCODONE AND ACETAMINOPHEN 5; 325 MG/1; MG/1
5-325 TABLET ORAL
Qty: 12 | Refills: 0 | Status: DISCONTINUED | COMMUNITY
Start: 2022-04-25

## 2022-08-02 RX ORDER — CEPHALEXIN 500 MG/1
500 CAPSULE ORAL
Qty: 9 | Refills: 0 | Status: DISCONTINUED | COMMUNITY
Start: 2022-04-25

## 2022-08-02 RX ORDER — ATENOLOL 100 MG/1
100 TABLET ORAL
Qty: 90 | Refills: 0 | Status: DISCONTINUED | COMMUNITY
Start: 2021-10-29

## 2022-08-02 RX ORDER — LEVOTHYROXINE SODIUM 0.2 MG/1
200 TABLET ORAL DAILY
Refills: 0 | Status: ACTIVE | COMMUNITY
Start: 2022-04-16

## 2022-08-02 RX ORDER — AMOXICILLIN 500 MG/1
500 TABLET, FILM COATED ORAL
Qty: 20 | Refills: 0 | Status: DISCONTINUED | COMMUNITY
Start: 2022-06-10

## 2022-08-02 RX ORDER — METOLAZONE 2.5 MG/1
2.5 TABLET ORAL DAILY
Refills: 0 | Status: DISCONTINUED | COMMUNITY
Start: 2022-04-16 | End: 2022-08-02

## 2022-08-02 RX ORDER — KETOCONAZOLE 20 MG/G
2 CREAM TOPICAL
Qty: 15 | Refills: 0 | Status: ACTIVE | COMMUNITY
Start: 2022-04-05

## 2022-08-02 RX ORDER — BENZONATATE 200 MG/1
200 CAPSULE ORAL
Qty: 90 | Refills: 0 | Status: DISCONTINUED | COMMUNITY
Start: 2022-07-27

## 2022-08-02 RX ORDER — ALPRAZOLAM 0.5 MG/1
0.5 TABLET ORAL
Qty: 30 | Refills: 0 | Status: DISCONTINUED | COMMUNITY
Start: 2022-02-21

## 2022-08-02 RX ORDER — IBUPROFEN 600 MG/1
600 TABLET, FILM COATED ORAL
Qty: 90 | Refills: 0 | Status: ACTIVE | COMMUNITY
Start: 2022-05-04

## 2022-08-03 NOTE — CARDIOLOGY SUMMARY
[de-identified] : today:\par SR @ 76 bpm; RBBB/left axis [de-identified] : 2 week MCOT monitor (3/17-3/31/22):\par SR between  bpm with avg HR 67 bpm\par 1 NSVT: 5 beats @ 102 bpm\par 1 episode of AT @ 3 beats\par 84 pauses up to 6.4 seconds\par episode of nocturnal 2:1 AVB [de-identified] : 12/21/2020:\par EF 60-65%\par Moderate concentric LVH\par Mild MR/TR

## 2022-08-03 NOTE — DISCUSSION/SUMMARY
[FreeTextEntry1] : 67 year old gentleman with a history of hypertension, hyperlipidemia, obesity, baseline right bundle branch block with left axis, syncope with subsequent "spacing out" episodes and event monitor showing multiple long pauses up to 6.4 seconds during wake hours. He is now s/p PPM (Medtronic [place] 4/25/2022).  \par \par Device function is normal with adequate safety margins.  Stored data is significant for episodes of NSVT.  He has normal LV function, but does have concentric LVH. I agree with beta blocker therapy, ie continue with atenolol. \par \par He requires no further electrophysiologic evaluation prior to planned dental work with Dr Harpal Montelongo.

## 2022-08-03 NOTE — HISTORY OF PRESENT ILLNESS
[de-identified] : 67 year old gentleman with a history of hypertension, hyperlipidemia, obesity, baseline right bundle branch block with left axis, syncope with subsequent "spacing out" episodes and event monitor showing multiple long pauses up to 6.4 seconds during wake hours. He is now s/p PPM (Medtronic [place] 4/25/2022). \par \par No longer having the episodes of when he was "nodding out", ie changes in sensorium.  No exercise regimen.  He does walk the dog.

## 2022-08-03 NOTE — PHYSICAL EXAM
[Well Nourished] : well nourished [No Acute Distress] : no acute distress [Obese] : obese [Normal Conjunctiva] : normal conjunctiva [Normal Venous Pressure] : normal venous pressure [Normal S1, S2] : normal S1, S2 [No Murmur] : no murmur [No Rub] : no rub [No Gallop] : no gallop [Clear Lung Fields] : clear lung fields [Good Air Entry] : good air entry [No Respiratory Distress] : no respiratory distress  [Soft] : abdomen soft [Non Tender] : non-tender [Normal Bowel Sounds] : normal bowel sounds [Normal Gait] : normal gait [No Edema] : no edema [No Cyanosis] : no cyanosis [No Rash] : no rash [Moves all extremities] : moves all extremities [No Focal Deficits] : no focal deficits [Normal Speech] : normal speech [Alert and Oriented] : alert and oriented [Normal memory] : normal memory [General Appearance - Well Developed] : well developed [Normal Appearance] : normal appearance [Well Groomed] : well groomed [General Appearance - Well Nourished] : well nourished [No Deformities] : no deformities [General Appearance - In No Acute Distress] : no acute distress [Heart Rate And Rhythm] : heart rate and rhythm were normal [Heart Sounds] : normal S1 and S2 [Murmurs] : no murmurs present [Respiration, Rhythm And Depth] : normal respiratory rhythm and effort [Exaggerated Use Of Accessory Muscles For Inspiration] : no accessory muscle use [Auscultation Breath Sounds / Voice Sounds] : lungs were clear to auscultation bilaterally [Left Infraclavicular] : left infraclavicular area [Clean] : clean [Dry] : dry [Well-Healed] : well-healed [Abdomen Soft] : soft [Abdomen Tenderness] : non-tender [Abdomen Mass (___ Cm)] : no abdominal mass palpated [Nail Clubbing] : no clubbing of the fingernails [Cyanosis, Localized] : no localized cyanosis [Petechial Hemorrhages (___cm)] : no petechial hemorrhages [] : no ischemic changes

## 2022-08-03 NOTE — PROCEDURE
[Pacemaker] : pacemaker [Threshold Testing Performed] : Threshold testing was performed [Lead Imp:  ___ohms] : lead impedance was [unfilled] ohms [Sensing Amplitude ___mv] : sensing amplitude was [unfilled] mv [___V @] : [unfilled] V [___ ms] : [unfilled] ms [de-identified] : Medtronic [de-identified] : Tashia APONTE DR MRI [de-identified] : [id number] [de-identified] : 4/25/2022

## 2022-08-12 ENCOUNTER — APPOINTMENT (OUTPATIENT)
Dept: ORTHOPEDIC SURGERY | Facility: CLINIC | Age: 67
End: 2022-08-12

## 2022-08-12 VITALS — HEIGHT: 70 IN | BODY MASS INDEX: 43.67 KG/M2 | WEIGHT: 305 LBS

## 2022-08-12 PROCEDURE — J3490M: CUSTOM

## 2022-08-12 PROCEDURE — 20610 DRAIN/INJ JOINT/BURSA W/O US: CPT | Mod: LT

## 2022-08-12 PROCEDURE — 99213 OFFICE O/P EST LOW 20 MIN: CPT | Mod: 25

## 2022-08-12 NOTE — ASSESSMENT
[FreeTextEntry1] : LIPPE PATIENT HERE FOR SCHEDULED CSI\par R/B/A REVIEWED\par F/U 3 MONTHS TO REPEAT VISCO

## 2022-08-12 NOTE — PROCEDURE
[FreeTextEntry3] : - Large joint injection was performed of the LEFT knee. \par - The indication for this procedure was pain and inflammation. Patient has tried OTC's including aspirin, Ibuprofen, Aleve, etc or prescription NSAIDS, and/or exercises at home and/or physical therapy without satisfactory response, patient had decreased mobility in the joint and the risks benefits, and alternatives have been discussed, and verbal consent was obtained. The site was prepped with alcohol, betadine, ethyl chloride sprayed topically and sterile technique used. \par - An injection of Betamethasone 2cc; Lidocaine 3cc; Marcaine 3cc injected.\par - Patient was advised to call if redness, pain or fever occur, apply ice for 15 minutes out of every hour for the next 12-24 hours as tolerated and patient was advised to rest the joint(s) for 2 days. \par \par

## 2022-08-12 NOTE — HISTORY OF PRESENT ILLNESS
[Left Leg] : left leg [Gradual] : gradual [7] : 7 [3] : 3 [Dull/Aching] : dull/aching [Localized] : localized [Intermittent] : intermittent [Household chores] : household chores [Injection therapy] : injection therapy [Walking] : walking [Stairs] : stairs [de-identified] : pt presents here today with left knee pain for about 1 year\par pt has try cortisone and gel injections with dr ham with some relief [] : no [FreeTextEntry1] : knee [FreeTextEntry5] : no injury  [de-identified] : Dr Chapin  [de-identified] : cortisone and gel injections

## 2022-08-12 NOTE — IMAGING
[de-identified] : Varus deformity\par Mild effusion, no warmth, no ecchymosis\par Medial joint line tenderness to palpation \par Range of motion 5-110 with associated crepitus\par 5/5 quadriceps and hamstring strength\par Ligamentously stable\par Motor and sensory intact distally\par Mildly antalgic gait\par Negative Jayne\par

## 2022-08-22 NOTE — ASU PREOP CHECKLIST - NS PREOP CHK MONITOR ANESTHESIA CONSENT
Call to patient. Patient denies any complaints related to anticoagulation therapy at this time. Patient reports no change in medication, diet or health. Reinforced with patient to call clinic with any medication changes as this can impact INR. Reinforced signs and symptoms bleeding/clotting with patient. Patient aware to seek medical care if signs and symptoms develop. Advised that if patient falls and/or hits their head, they should seek medical attention. Verbalizes understanding.     Dosing instructions given to patient verbally over the phone. Advised to call the clinic with any questions or concerns. Patient verbalizes understanding. Has clinic number.    Anticoagulation Summary  As of 2022    INR goal:  2.0-3.0   TTR:  53.8 % (4.6 y)   INR used for dosin.8 (2022)   Warfarin maintenance plan:  5 mg (2 mg x 2.5) every , , ; 4 mg (2 mg x 2) all other days   Weekly warfarin total:  31 mg   Plan last modified:  Isadora Vargas RN (2022)   Next INR check:  2022   Priority:  Follow-Up - 4 Weeks   Target end date:      Indications    Occlusion and stenosis of unspecified carotid artery [I65.29]  Encounter for therapeutic drug monitoring [Z51.81] [Z51.81]  Long term (current) use of anticoagulants [Z79.01] [Z79.01]  Cerebrovascular accident (CVA)  unspecified mechanism (CMS/HCC) (Resolved) [I63.9]  History of ischemic right MCA stroke [Z86.73]             Anticoagulation Episode Summary     INR check location:  Anticoagulation Clinic    Preferred lab:      Send INR reminders to:  [facility] (OPEN ENROLLMENT) ACS CARD/EP    Comments:  *New STAC entered 2022; *[facility] 2022; ACC/ACL; 2mg tabs      Anticoagulation Care Providers     Provider Role Specialty Phone number    Todd Rivero MD Responsible Internal Medicine- Interventional Cardiology [phone]          Supervising provider: Gabriele Allen MD     done

## 2022-11-02 ENCOUNTER — APPOINTMENT (OUTPATIENT)
Dept: ELECTROPHYSIOLOGY | Facility: CLINIC | Age: 67
End: 2022-11-02

## 2022-11-02 ENCOUNTER — NON-APPOINTMENT (OUTPATIENT)
Age: 67
End: 2022-11-02

## 2022-11-02 PROCEDURE — 93296 REM INTERROG EVL PM/IDS: CPT

## 2022-11-02 PROCEDURE — 93294 REM INTERROG EVL PM/LDLS PM: CPT

## 2022-11-08 ENCOUNTER — APPOINTMENT (OUTPATIENT)
Dept: ORTHOPEDIC SURGERY | Facility: CLINIC | Age: 67
End: 2022-11-08

## 2022-11-08 VITALS — WEIGHT: 305 LBS | HEIGHT: 70 IN | BODY MASS INDEX: 43.67 KG/M2

## 2022-11-08 PROCEDURE — 20610 DRAIN/INJ JOINT/BURSA W/O US: CPT | Mod: 50

## 2022-11-08 NOTE — PROCEDURE
[Large Joint Injection] : Large joint injection [Bilateral] : bilaterally of the [Knee] : knee [X-ray evidence of Osteoarthritis on this or prior visit] : x-ray evidence of Osteoarthritis on this or prior visit [Alcohol] : alcohol [Betadine] : betadine [Ethyl Chloride sprayed topically] : ethyl chloride sprayed topically [Orthovisc] : Orthovisc [#1] : series #1 [Call if redness, pain or fever occur] : call if redness, pain or fever occur [Apply ice for 15min out of every hour for the next 12-24 hours as tolerated] : apply ice for 15 minutes out of every hour for the next 12-24 hours as tolerated [Patient was advised to rest the joint(s) for ____ days] : patient was advised to rest the joint(s) for [unfilled] days [Previous OTC use and PT nontherapeutic] : patient has tried OTC's including aspirin, Ibuprofen, Aleve, etc or prescription NSAIDS, and/or exercises at home and/or physical therapy without satisfactory response [Patient had decreased mobility in the joint] : patient had decreased mobility in the joint [Risks, benefits, alternatives discussed / Verbal consent obtained] : the risks benefits, and alternatives have been discussed, and verbal consent was obtained

## 2022-11-08 NOTE — PHYSICAL EXAM
[Bilateral] : knee bilaterally [5___] : hamstring 5[unfilled]/5 [Equivocal] : equivocal Mj [] : negative Lachmann [TWNoteComboBox7] : flexion 120 degrees [de-identified] : extension 0 degrees

## 2022-11-08 NOTE — HISTORY OF PRESENT ILLNESS
[4] : 4 [Orthovisc] : Orthovisc [de-identified] : 5-31-22- completed orthovisc left knee 4/11/22. [FreeTextEntry1] : Left knee [FreeTextEntry5] : Pt is here for a follow up, he reports no changes since last visit.  [de-identified] : 4/4/22 [de-identified] : left knee

## 2022-11-08 NOTE — REASON FOR VISIT
[FreeTextEntry2] : 11/8/22- Having pain both knees, would like to start viscoinjections\par 4/4/22: Orthovisc #3 left knee\par 3/28/22- for continued orthovisc injections to the left knee\par 2/21/22: Patient presents for worsening L posterior knee pain after passing out in the kitchen 1 month ago. described as a dull ache. Denies associated swelling. Aggravated by walking, twisting motions, or prolonged standing. Slightly alleviated by Advil and heating pads. denies radiation. Denies prior injuries/surgeries/treatments for L knee. Denies associated clicking or giving out. Patient had a doppler US done on 2/16 as a routine surgery f/u, where he was told he may have a Baker's cyst. Denies calf pain.\par \par PMH: HTN. Denies DM, high cholesterol, or CA Hx\par left knee pain\par

## 2022-11-15 ENCOUNTER — APPOINTMENT (OUTPATIENT)
Dept: ORTHOPEDIC SURGERY | Facility: CLINIC | Age: 67
End: 2022-11-15

## 2022-11-15 PROCEDURE — 20610 DRAIN/INJ JOINT/BURSA W/O US: CPT | Mod: 50

## 2022-11-15 NOTE — PROCEDURE
[Large Joint Injection] : Large joint injection [Bilateral] : bilaterally of the [Knee] : knee [X-ray evidence of Osteoarthritis on this or prior visit] : x-ray evidence of Osteoarthritis on this or prior visit [Alcohol] : alcohol [Betadine] : betadine [Ethyl Chloride sprayed topically] : ethyl chloride sprayed topically [Orthovisc] : Orthovisc [#2] : series #2 [] : Patient tolerated procedure well [Call if redness, pain or fever occur] : call if redness, pain or fever occur [Apply ice for 15min out of every hour for the next 12-24 hours as tolerated] : apply ice for 15 minutes out of every hour for the next 12-24 hours as tolerated [Patient was advised to rest the joint(s) for ____ days] : patient was advised to rest the joint(s) for [unfilled] days [Previous OTC use and PT nontherapeutic] : patient has tried OTC's including aspirin, Ibuprofen, Aleve, etc or prescription NSAIDS, and/or exercises at home and/or physical therapy without satisfactory response [Patient had decreased mobility in the joint] : patient had decreased mobility in the joint [Risks, benefits, alternatives discussed / Verbal consent obtained] : the risks benefits, and alternatives have been discussed, and verbal consent was obtained

## 2022-11-15 NOTE — PHYSICAL EXAM
[Bilateral] : knee bilaterally [5___] : hamstring 5[unfilled]/5 [Equivocal] : equivocal Mj [] : no lateral facet of patella tenderness [TWNoteComboBox7] : flexion 120 degrees [de-identified] : extension 0 degrees

## 2022-11-15 NOTE — HISTORY OF PRESENT ILLNESS
[4] : 4 [Orthovisc] : Orthovisc [de-identified] : 11-15-22- for continued b/l knee orthovisc injections #2 [FreeTextEntry1] : Left knee [FreeTextEntry5] : Pt is here for a follow up, he reports no changes since last visit.  [de-identified] : 4/4/22 [de-identified] : left knee

## 2022-11-22 ENCOUNTER — APPOINTMENT (OUTPATIENT)
Dept: ORTHOPEDIC SURGERY | Facility: CLINIC | Age: 67
End: 2022-11-22

## 2022-11-22 PROCEDURE — 20610 DRAIN/INJ JOINT/BURSA W/O US: CPT | Mod: 50

## 2022-11-22 NOTE — PROCEDURE
[Large Joint Injection] : Large joint injection [Bilateral] : bilaterally of the [Knee] : knee [X-ray evidence of Osteoarthritis on this or prior visit] : x-ray evidence of Osteoarthritis on this or prior visit [Alcohol] : alcohol [Betadine] : betadine [Ethyl Chloride sprayed topically] : ethyl chloride sprayed topically [Orthovisc (30mg)] : 30mg of Orthovisc [#3] : series #3 [] : Patient tolerated procedure well [Call if redness, pain or fever occur] : call if redness, pain or fever occur [Apply ice for 15min out of every hour for the next 12-24 hours as tolerated] : apply ice for 15 minutes out of every hour for the next 12-24 hours as tolerated [Patient was advised to rest the joint(s) for ____ days] : patient was advised to rest the joint(s) for [unfilled] days [Previous OTC use and PT nontherapeutic] : patient has tried OTC's including aspirin, Ibuprofen, Aleve, etc or prescription NSAIDS, and/or exercises at home and/or physical therapy without satisfactory response [Patient had decreased mobility in the joint] : patient had decreased mobility in the joint [Risks, benefits, alternatives discussed / Verbal consent obtained] : the risks benefits, and alternatives have been discussed, and verbal consent was obtained

## 2022-11-22 NOTE — HISTORY OF PRESENT ILLNESS
[4] : 4 [Orthovisc] : Orthovisc [de-identified] : 11-15-22- for continued b/l knee orthovisc injections #2 [FreeTextEntry1] : Left knee [FreeTextEntry5] : Pt is here for a follow up, he reports no changes since last visit.  [de-identified] : 4/4/22 [de-identified] : left knee

## 2022-11-22 NOTE — PHYSICAL EXAM
[Bilateral] : knee bilaterally [5___] : hamstring 5[unfilled]/5 [Equivocal] : equivocal Mj [] : negative Lachmann [TWNoteComboBox7] : flexion 120 degrees [de-identified] : extension 0 degrees

## 2022-11-30 NOTE — ED PROVIDER NOTE - SKIN, MLM
Skin normal color for race, warm, dry and intact. 2x2 cm subgaleal hematoma in right parietal area. Signs of venous stasis in lower extremities with erythema. Alar Island Pedicle Flap Text: The defect edges were debeveled with a #15 scalpel blade.  Given the location of the defect, shape of the defect and the proximity to the alar rim an island pedicle advancement flap was deemed most appropriate.  Using a sterile surgical marker, an appropriate advancement flap was drawn incorporating the defect, outlining the appropriate donor tissue and placing the expected incisions within the nasal ala running parallel to the alar rim. The area thus outlined was incised with a #15 scalpel blade.  The skin margins were undermined minimally to an appropriate distance in all directions around the primary defect and laterally outward around the island pedicle utilizing iris scissors.  There was minimal undermining beneath the pedicle flap.

## 2022-12-02 ENCOUNTER — APPOINTMENT (OUTPATIENT)
Dept: ORTHOPEDIC SURGERY | Facility: CLINIC | Age: 67
End: 2022-12-02

## 2022-12-02 PROCEDURE — 20610 DRAIN/INJ JOINT/BURSA W/O US: CPT | Mod: 50

## 2022-12-02 PROCEDURE — 99213 OFFICE O/P EST LOW 20 MIN: CPT | Mod: 25

## 2022-12-02 NOTE — PHYSICAL EXAM
[Bilateral] : knee bilaterally [5___] : hamstring 5[unfilled]/5 [Equivocal] : equivocal Mj [] : negative Lachmann [TWNoteComboBox7] : flexion 120 degrees [de-identified] : extension 0 degrees

## 2022-12-02 NOTE — PROCEDURE
[Large Joint Injection] : Large joint injection [Bilateral] : bilaterally of the [Knee] : knee [X-ray evidence of Osteoarthritis on this or prior visit] : x-ray evidence of Osteoarthritis on this or prior visit [Alcohol] : alcohol [Betadine] : betadine [Ethyl Chloride sprayed topically] : ethyl chloride sprayed topically [Orthovisc (30mg)] : 30mg of Orthovisc [] : Patient tolerated procedure well [Call if redness, pain or fever occur] : call if redness, pain or fever occur [Apply ice for 15min out of every hour for the next 12-24 hours as tolerated] : apply ice for 15 minutes out of every hour for the next 12-24 hours as tolerated [Patient was advised to rest the joint(s) for ____ days] : patient was advised to rest the joint(s) for [unfilled] days [Previous OTC use and PT nontherapeutic] : patient has tried OTC's including aspirin, Ibuprofen, Aleve, etc or prescription NSAIDS, and/or exercises at home and/or physical therapy without satisfactory response [Patient had decreased mobility in the joint] : patient had decreased mobility in the joint [Risks, benefits, alternatives discussed / Verbal consent obtained] : the risks benefits, and alternatives have been discussed, and verbal consent was obtained [#4] : series #4

## 2022-12-02 NOTE — HISTORY OF PRESENT ILLNESS
[4] : 4 [Orthovisc] : Orthovisc [de-identified] : 11-15-22- for continued b/l knee orthovisc injections #2 [FreeTextEntry1] : Left knee [FreeTextEntry5] : Pt is here for a follow up, he reports no changes since last visit.  [de-identified] : 4/4/22 [de-identified] : left knee

## 2023-01-12 ENCOUNTER — APPOINTMENT (OUTPATIENT)
Dept: ORTHOPEDIC SURGERY | Facility: CLINIC | Age: 68
End: 2023-01-12
Payer: MEDICARE

## 2023-01-12 VITALS — WEIGHT: 305 LBS | HEIGHT: 70 IN | BODY MASS INDEX: 43.67 KG/M2

## 2023-01-12 DIAGNOSIS — Z00.00 ENCOUNTER FOR GENERAL ADULT MEDICAL EXAMINATION W/OUT ABNORMAL FINDINGS: ICD-10-CM

## 2023-01-12 PROCEDURE — 20610 DRAIN/INJ JOINT/BURSA W/O US: CPT | Mod: 50

## 2023-01-12 PROCEDURE — 99213 OFFICE O/P EST LOW 20 MIN: CPT | Mod: 25

## 2023-01-12 NOTE — HISTORY OF PRESENT ILLNESS
[4] : 4 [Orthovisc] : Orthovisc [de-identified] : 1-12-23- He completed orthovisc both knees 12-2-22 minimal help. Pain persists requesting csi both knees \par \par 11-15-22- for continued b/l knee orthovisc injections #2 [FreeTextEntry1] : Left knee [FreeTextEntry5] : Pt is here for a follow up, he reports no changes since last visit.  [de-identified] : 4/4/22 [de-identified] : left knee

## 2023-01-12 NOTE — PHYSICAL EXAM
[Bilateral] : knee bilaterally [5___] : hamstring 5[unfilled]/5 [Equivocal] : equivocal Mj [] : negative Lachmann [TWNoteComboBox7] : flexion 120 degrees [de-identified] : extension 0 degrees

## 2023-01-12 NOTE — PROCEDURE
[Large Joint Injection] : Large joint injection [Bilateral] : bilaterally of the [Knee] : knee [X-ray evidence of Osteoarthritis on this or prior visit] : x-ray evidence of Osteoarthritis on this or prior visit [Alcohol] : alcohol [Betadine] : betadine [Ethyl Chloride sprayed topically] : ethyl chloride sprayed topically [___ cc    6mg] :  Betamethasone (Celestone) ~Vcc of 6mg [___ cc    1%] : Lidocaine ~Vcc of 1%  [___ cc    0.25%] : Bupivacaine (Marcaine) ~Vcc of 0.25%  [] : Patient tolerated procedure well [Call if redness, pain or fever occur] : call if redness, pain or fever occur [Apply ice for 15min out of every hour for the next 12-24 hours as tolerated] : apply ice for 15 minutes out of every hour for the next 12-24 hours as tolerated [Patient was advised to rest the joint(s) for ____ days] : patient was advised to rest the joint(s) for [unfilled] days [Previous OTC use and PT nontherapeutic] : patient has tried OTC's including aspirin, Ibuprofen, Aleve, etc or prescription NSAIDS, and/or exercises at home and/or physical therapy without satisfactory response [Patient had decreased mobility in the joint] : patient had decreased mobility in the joint [Risks, benefits, alternatives discussed / Verbal consent obtained] : the risks benefits, and alternatives have been discussed, and verbal consent was obtained

## 2023-01-31 ENCOUNTER — NON-APPOINTMENT (OUTPATIENT)
Age: 68
End: 2023-01-31

## 2023-02-01 ENCOUNTER — APPOINTMENT (OUTPATIENT)
Dept: ELECTROPHYSIOLOGY | Facility: CLINIC | Age: 68
End: 2023-02-01
Payer: MEDICARE

## 2023-02-01 ENCOUNTER — NON-APPOINTMENT (OUTPATIENT)
Age: 68
End: 2023-02-01

## 2023-02-01 PROCEDURE — 93294 REM INTERROG EVL PM/LDLS PM: CPT

## 2023-02-01 PROCEDURE — 93296 REM INTERROG EVL PM/IDS: CPT

## 2023-02-06 NOTE — PHYSICAL EXAM
[General Appearance - Well Developed] : well developed [Normal Appearance] : normal appearance [Well Groomed] : well groomed [General Appearance - Well Nourished] : well nourished [No Deformities] : no deformities [General Appearance - In No Acute Distress] : no acute distress [Heart Rate And Rhythm] : heart rate and rhythm were normal [Heart Sounds] : normal S1 and S2 [Murmurs] : no murmurs present [Respiration, Rhythm And Depth] : normal respiratory rhythm and effort [Exaggerated Use Of Accessory Muscles For Inspiration] : no accessory muscle use [Auscultation Breath Sounds / Voice Sounds] : lungs were clear to auscultation bilaterally [Left Infraclavicular] : left infraclavicular area [Clean] : clean [Dry] : dry [Well-Healed] : well-healed [Abdomen Soft] : soft [Abdomen Tenderness] : non-tender [Abdomen Mass (___ Cm)] : no abdominal mass palpated [Nail Clubbing] : no clubbing of the fingernails [Cyanosis, Localized] : no localized cyanosis [Petechial Hemorrhages (___cm)] : no petechial hemorrhages [] : no ischemic changes [Well Nourished] : well nourished [No Acute Distress] : no acute distress [Obese] : obese [Normal Conjunctiva] : normal conjunctiva [Normal Venous Pressure] : normal venous pressure [Normal S1, S2] : normal S1, S2 [No Murmur] : no murmur [No Rub] : no rub [No Gallop] : no gallop [Clear Lung Fields] : clear lung fields [Good Air Entry] : good air entry [No Respiratory Distress] : no respiratory distress  [Soft] : abdomen soft [Non Tender] : non-tender [Normal Bowel Sounds] : normal bowel sounds [Normal Gait] : normal gait [No Edema] : no edema [No Cyanosis] : no cyanosis [No Rash] : no rash [Moves all extremities] : moves all extremities [No Focal Deficits] : no focal deficits [Normal Speech] : normal speech [Alert and Oriented] : alert and oriented [Normal memory] : normal memory

## 2023-02-07 ENCOUNTER — NON-APPOINTMENT (OUTPATIENT)
Age: 68
End: 2023-02-07

## 2023-02-07 ENCOUNTER — APPOINTMENT (OUTPATIENT)
Dept: ELECTROPHYSIOLOGY | Facility: CLINIC | Age: 68
End: 2023-02-07
Payer: MEDICARE

## 2023-02-07 VITALS — HEART RATE: 70 BPM | SYSTOLIC BLOOD PRESSURE: 99 MMHG | DIASTOLIC BLOOD PRESSURE: 66 MMHG | OXYGEN SATURATION: 96 %

## 2023-02-07 PROCEDURE — 99213 OFFICE O/P EST LOW 20 MIN: CPT

## 2023-02-07 PROCEDURE — 93280 PM DEVICE PROGR EVAL DUAL: CPT

## 2023-02-10 NOTE — DISCUSSION/SUMMARY
[FreeTextEntry1] : 67 year old gentleman with a history of hypertension, hyperlipidemia, obesity, baseline right bundle branch block with left axis, syncope with subsequent "spacing out" episodes and event monitor showing multiple long pauses up to 6.4 seconds during wake hours. He is now s/p PPM (Medtronic Tashia 4/25/2022).  \par \par Device function is normal with adequate safety margins.  Stored data is significant for episodes of NSVT.  He has normal LV function, but does have concentric LVH. I agree with beta blocker therapy, ie continue with atenolol.

## 2023-02-10 NOTE — PROCEDURE
[Pacemaker] : pacemaker [Threshold Testing Performed] : Threshold testing was performed [Lead Imp:  ___ohms] : lead impedance was [unfilled] ohms [Sensing Amplitude ___mv] : sensing amplitude was [unfilled] mv [___V @] : [unfilled] V [___ ms] : [unfilled] ms [de-identified] : Medtronic [de-identified] : Tashia APONTE DR MRI [de-identified] : [id number] [de-identified] : 4/25/2022

## 2023-02-10 NOTE — HISTORY OF PRESENT ILLNESS
[de-identified] : 67 year old gentleman with a history of hypertension, hyperlipidemia, obesity, baseline right bundle branch block with left axis, syncope with subsequent "spacing out" episodes and event monitor showing multiple long pauses up to 6.4 seconds during wake hours. He is now s/p PPM ([employer]onic [place] 4/25/2022). \par \par No longer having the episodes of when he was "nodding out", ie changes in sensorium.  No exercise regimen.  He does walk the dog.

## 2023-02-10 NOTE — CARDIOLOGY SUMMARY
[de-identified] : today:SR. DDD pacing [de-identified] : 2 week MCOT monitor (3/17-3/31/22):\par SR between  bpm with avg HR 67 bpm\par 1 NSVT: 5 beats @ 102 bpm\par 1 episode of AT @ 3 beats\par 84 pauses up to 6.4 seconds\par episode of nocturnal 2:1 AVB [de-identified] : 12/21/2020:\par EF 60-65%\par Moderate concentric LVH\par Mild MR/TR

## 2023-04-13 ENCOUNTER — APPOINTMENT (OUTPATIENT)
Dept: ORTHOPEDIC SURGERY | Facility: CLINIC | Age: 68
End: 2023-04-13
Payer: MEDICARE

## 2023-04-13 VITALS — WEIGHT: 305 LBS | HEIGHT: 70 IN | BODY MASS INDEX: 43.67 KG/M2

## 2023-04-13 DIAGNOSIS — M17.11 UNILATERAL PRIMARY OSTEOARTHRITIS, RIGHT KNEE: ICD-10-CM

## 2023-04-13 PROCEDURE — 99213 OFFICE O/P EST LOW 20 MIN: CPT | Mod: 25

## 2023-04-13 PROCEDURE — 20610 DRAIN/INJ JOINT/BURSA W/O US: CPT | Mod: 50

## 2023-04-13 NOTE — PROCEDURE
[Large Joint Injection] : Large joint injection [Bilateral] : bilaterally of the [Knee] : knee [X-ray evidence of Osteoarthritis on this or prior visit] : x-ray evidence of Osteoarthritis on this or prior visit [Alcohol] : alcohol [Betadine] : betadine [Ethyl Chloride sprayed topically] : ethyl chloride sprayed topically [___ cc    6mg] :  Betamethasone (Celestone) ~Vcc of 6mg [___ cc    0.25%] : Bupivacaine (Marcaine) ~Vcc of 0.25%  [] : Patient tolerated procedure well [Call if redness, pain or fever occur] : call if redness, pain or fever occur [Apply ice for 15min out of every hour for the next 12-24 hours as tolerated] : apply ice for 15 minutes out of every hour for the next 12-24 hours as tolerated [Patient was advised to rest the joint(s) for ____ days] : patient was advised to rest the joint(s) for [unfilled] days [Previous OTC use and PT nontherapeutic] : patient has tried OTC's including aspirin, Ibuprofen, Aleve, etc or prescription NSAIDS, and/or exercises at home and/or physical therapy without satisfactory response [Patient had decreased mobility in the joint] : patient had decreased mobility in the joint [Risks, benefits, alternatives discussed / Verbal consent obtained] : the risks benefits, and alternatives have been discussed, and verbal consent was obtained [___ cc    1%] : Lidocaine ~Vcc of 1%

## 2023-04-13 NOTE — HISTORY OF PRESENT ILLNESS
[4] : 4 [Orthovisc] : Orthovisc [de-identified] : 1-12-23- He completed orthovisc both knees 12-2-22 minimal help. Pain persists requesting csi both knees \par \par 11-15-22- for continued b/l knee orthovisc injections #2 [FreeTextEntry1] : Left knee [FreeTextEntry5] : Pt is here for a follow up, he reports no changes since last visit.  [de-identified] : 4/4/22 [de-identified] : left knee

## 2023-04-13 NOTE — PHYSICAL EXAM
[Bilateral] : knee bilaterally [5___] : hamstring 5[unfilled]/5 [Equivocal] : equivocal Mj [] : negative Lachmann [TWNoteComboBox7] : flexion 120 degrees [de-identified] : extension 0 degrees

## 2023-05-09 ENCOUNTER — NON-APPOINTMENT (OUTPATIENT)
Age: 68
End: 2023-05-09

## 2023-05-09 ENCOUNTER — APPOINTMENT (OUTPATIENT)
Dept: ELECTROPHYSIOLOGY | Facility: CLINIC | Age: 68
End: 2023-05-09
Payer: MEDICARE

## 2023-05-09 PROCEDURE — 93294 REM INTERROG EVL PM/LDLS PM: CPT

## 2023-05-09 PROCEDURE — 93296 REM INTERROG EVL PM/IDS: CPT

## 2023-07-12 NOTE — H&P PST ADULT - CONTACT INFO FOR SLEEP STUDY
injection 3,880 Units    sodium chloride flush 0.9 % injection 5-40 mL    sodium chloride flush 0.9 % injection 5-40 mL    0.9 % sodium chloride infusion    OR Linked Order Group     ondansetron (ZOFRAN-ODT) disintegrating tablet 4 mg     ondansetron (ZOFRAN) injection 4 mg    polyethylene glycol (GLYCOLAX) packet 17 g    OR Linked Order Group     acetaminophen (TYLENOL) tablet 650 mg     acetaminophen (TYLENOL) suppository 650 mg    lactated ringers IV soln infusion    lidocaine 4 % external patch 1 patch    ipratropium 0.5 mg-albuterol 2.5 mg (DUONEB) nebulizer solution 1 Dose     Order Specific Question:   Initiate RT Bronchodilator Protocol     Answer:   Yes - Inpatient Protocol    doxycycline hyclate (VIBRAMYCIN) capsule 100 mg     Order Specific Question:   Antimicrobial Indications     Answer:   COPD Exacerbation     Order Specific Question:   COPD exacerbation duration of therapy     Answer:   5 days    predniSONE (DELTASONE) tablet 40 mg    nicotine (NICODERM CQ) 21 MG/24HR 1 patch    vitamin D (ERGOCALCIFEROL) capsule 36,330 Units    folic acid-pyridoxine-cyancobalamin 2.5-25-2 mg tablet (FOLTX) 2.5-25-2 MG 2.5-25-2 mg tablet 1 tablet    OR Linked Order Group     potassium chloride (KLOR-CON M) extended release tablet 40 mEq     potassium bicarb-citric acid (EFFER-K) effervescent tablet 40 mEq     potassium chloride 10 mEq/100 mL IVPB (Peripheral Line)    magnesium sulfate 2000 mg in 50 mL IVPB premix    aluminum & magnesium hydroxide-simethicone (MAALOX) 200-200-20 MG/5ML suspension 30 mL       I have personally reviewed labs and tests:  Recent Labs:  Recent Results (from the past 24 hour(s))   EKG 12 Lead    Collection Time: 07/11/23  4:39 PM   Result Value Ref Range    Ventricular Rate 83 BPM    Atrial Rate 84 BPM    P-R Interval 150 ms    QRS Duration 115 ms    Q-T Interval 409 ms    QTc Calculation (Bazett) 481 ms    P Axis 67 degrees    R Axis -44 degrees    T Axis 80 degrees    Diagnosis       Sinus  [phone]

## 2023-07-31 NOTE — H&P PST ADULT - CARDIOVASCULAR
details… detailed exam Xeleunicez Pregnancy And Lactation Text: This medication is Pregnancy Category D and is not considered safe during pregnancy.  The risk during breast feeding is also uncertain.

## 2023-08-06 NOTE — PHYSICAL EXAM
[General Appearance - Well Developed] : well developed [Normal Appearance] : normal appearance [Well Groomed] : well groomed [General Appearance - Well Nourished] : well nourished [No Deformities] : no deformities [General Appearance - In No Acute Distress] : no acute distress [Heart Rate And Rhythm] : heart rate and rhythm were normal [Heart Sounds] : normal S1 and S2 [Murmurs] : no murmurs present [Respiration, Rhythm And Depth] : normal respiratory rhythm and effort [Exaggerated Use Of Accessory Muscles For Inspiration] : no accessory muscle use [Auscultation Breath Sounds / Voice Sounds] : lungs were clear to auscultation bilaterally [Left Infraclavicular] : left infraclavicular area [Clean] : clean [Dry] : dry [Well-Healed] : well-healed [Abdomen Tenderness] : non-tender [Abdomen Soft] : soft [Abdomen Mass (___ Cm)] : no abdominal mass palpated [Nail Clubbing] : no clubbing of the fingernails [Cyanosis, Localized] : no localized cyanosis [Petechial Hemorrhages (___cm)] : no petechial hemorrhages [] : no ischemic changes [Well Nourished] : well nourished [No Acute Distress] : no acute distress [Obese] : obese [Normal Conjunctiva] : normal conjunctiva [Normal S1, S2] : normal S1, S2 [Normal Venous Pressure] : normal venous pressure [No Murmur] : no murmur [No Rub] : no rub [Clear Lung Fields] : clear lung fields [No Gallop] : no gallop [Good Air Entry] : good air entry [No Respiratory Distress] : no respiratory distress  [Soft] : abdomen soft [Non Tender] : non-tender [Normal Bowel Sounds] : normal bowel sounds [Normal Gait] : normal gait [No Edema] : no edema [No Cyanosis] : no cyanosis [No Rash] : no rash [Moves all extremities] : moves all extremities [No Focal Deficits] : no focal deficits [Normal Speech] : normal speech [Alert and Oriented] : alert and oriented [Normal memory] : normal memory

## 2023-08-08 ENCOUNTER — APPOINTMENT (OUTPATIENT)
Dept: ELECTROPHYSIOLOGY | Facility: CLINIC | Age: 68
End: 2023-08-08
Payer: MEDICARE

## 2023-08-08 VITALS
OXYGEN SATURATION: 95 % | HEIGHT: 70 IN | BODY MASS INDEX: 44.38 KG/M2 | WEIGHT: 310 LBS | SYSTOLIC BLOOD PRESSURE: 109 MMHG | HEART RATE: 73 BPM | DIASTOLIC BLOOD PRESSURE: 71 MMHG

## 2023-08-08 DIAGNOSIS — I44.30 UNSPECIFIED ATRIOVENTRICULAR BLOCK: ICD-10-CM

## 2023-08-08 DIAGNOSIS — I47.29 OTHER VENTRICULAR TACHYCARDIA: ICD-10-CM

## 2023-08-08 PROCEDURE — 99213 OFFICE O/P EST LOW 20 MIN: CPT

## 2023-08-08 PROCEDURE — 93280 PM DEVICE PROGR EVAL DUAL: CPT

## 2023-08-08 RX ORDER — QUINAPRIL HYDROCHLORIDE 40 MG/1
40 TABLET, FILM COATED ORAL DAILY
Refills: 0 | Status: DISCONTINUED | COMMUNITY
Start: 2022-04-16 | End: 2023-08-08

## 2023-08-08 RX ORDER — TOBRAMYCIN AND DEXAMETHASONE 3; 1 MG/ML; MG/ML
0.3-0.1 SUSPENSION/ DROPS OPHTHALMIC
Qty: 5 | Refills: 0 | Status: DISCONTINUED | COMMUNITY
Start: 2022-07-01 | End: 2023-08-08

## 2023-08-08 RX ORDER — TRAMADOL HYDROCHLORIDE 50 MG/1
50 TABLET, COATED ORAL
Qty: 20 | Refills: 0 | Status: DISCONTINUED | COMMUNITY
Start: 2022-05-31 | End: 2023-08-08

## 2023-08-08 RX ORDER — LISINOPRIL 40 MG/1
40 TABLET ORAL DAILY
Refills: 0 | Status: ACTIVE | COMMUNITY

## 2023-08-08 RX ORDER — SODIUM FLUORIDE 0.9 MG/ML
0.2 MOUTHWASH DENTAL DAILY
Qty: 473 | Refills: 0 | Status: DISCONTINUED | COMMUNITY
Start: 2022-02-23 | End: 2023-08-08

## 2023-08-08 RX ORDER — SODIUM FLUORIDE 6 MG/ML
1.1 PASTE DENTAL
Qty: 100 | Refills: 0 | Status: DISCONTINUED | COMMUNITY
Start: 2022-03-23 | End: 2023-08-08

## 2023-08-08 RX ORDER — FUROSEMIDE 40 MG/1
40 TABLET ORAL
Qty: 90 | Refills: 0 | Status: ACTIVE | COMMUNITY
Start: 2022-04-16

## 2023-08-09 PROBLEM — I44.30 AV BLOCK: Status: ACTIVE | Noted: 2022-04-18

## 2023-08-09 PROBLEM — I47.29 NSVT (NONSUSTAINED VENTRICULAR TACHYCARDIA): Status: ACTIVE | Noted: 2022-08-03

## 2023-08-09 NOTE — PROCEDURE
[Pacemaker] : pacemaker [Threshold Testing Performed] : Threshold testing was performed [Lead Imp:  ___ohms] : lead impedance was [unfilled] ohms [Sensing Amplitude ___mv] : sensing amplitude was [unfilled] mv [___V @] : [unfilled] V [___ ms] : [unfilled] ms [de-identified] : [employer]onic [de-identified] : Tashia APONTE DR MRI [de-identified] : [id number] [de-identified] : 4/25/2022

## 2023-08-09 NOTE — HISTORY OF PRESENT ILLNESS
[de-identified] : 68 year old gentleman with a history of hypertension, hyperlipidemia, obesity, baseline right bundle branch block with left axis, syncope with subsequent "spacing out" episodes and event monitor showing multiple long pauses up to 6.4 seconds during wake hours. He is now s/p PPM ([employer]tronic [place] 4/25/2022).   No longer having the episodes of when he was "nodding out", ie changes in sensorium.  No exercise regimen.  He does walk the dog.

## 2023-08-09 NOTE — CARDIOLOGY SUMMARY
[de-identified] : today:SR. DDD pacing [___] : [unfilled] [de-identified] : 2 week MCOT monitor (3/17-3/31/22):\par  SR between  bpm with avg HR 67 bpm\par  1 NSVT: 5 beats @ 102 bpm\par  1 episode of AT @ 3 beats\par  84 pauses up to 6.4 seconds\par  episode of nocturnal 2:1 AVB [de-identified] : 12/21/2020:\par  EF 60-65%\par  Moderate concentric LVH\par  Mild MR/TR

## 2023-08-09 NOTE — DISCUSSION/SUMMARY
[FreeTextEntry1] : 68 year old gentleman with a history of hypertension, hyperlipidemia, obesity, baseline right bundle branch block with left axis, syncope with subsequent "spacing out" episodes and event monitor showing multiple long pauses up to 6.4 seconds during wake hours. He is now s/p PPM ([employer]tronic Tashia 4/25/2022- [facility] lead).    Device function is normal with adequate safety margins.  Stored data is significant for episodes of NSVT.  He has normal LV function, but does have concentric LVH. I agree with beta blocker therapy, ie continue with atenolol.

## 2023-10-27 ENCOUNTER — APPOINTMENT (OUTPATIENT)
Dept: ORTHOPEDIC SURGERY | Facility: CLINIC | Age: 68
End: 2023-10-27
Payer: MEDICARE

## 2023-10-27 VITALS — BODY MASS INDEX: 44.38 KG/M2 | HEIGHT: 70 IN | WEIGHT: 310 LBS

## 2023-10-27 DIAGNOSIS — M19.042 PRIMARY OSTEOARTHRITIS, LEFT HAND: ICD-10-CM

## 2023-10-27 PROCEDURE — 73130 X-RAY EXAM OF HAND: CPT | Mod: LT

## 2023-10-27 PROCEDURE — 99213 OFFICE O/P EST LOW 20 MIN: CPT

## 2023-10-27 RX ORDER — METHYLPREDNISOLONE 4 MG/1
4 TABLET ORAL
Qty: 1 | Refills: 0 | Status: ACTIVE | COMMUNITY
Start: 2023-10-27 | End: 1900-01-01

## 2023-11-07 ENCOUNTER — APPOINTMENT (OUTPATIENT)
Dept: ELECTROPHYSIOLOGY | Facility: CLINIC | Age: 68
End: 2023-11-07
Payer: MEDICARE

## 2023-11-07 ENCOUNTER — NON-APPOINTMENT (OUTPATIENT)
Age: 68
End: 2023-11-07

## 2023-11-07 PROCEDURE — 93296 REM INTERROG EVL PM/IDS: CPT

## 2023-11-07 PROCEDURE — 93294 REM INTERROG EVL PM/LDLS PM: CPT

## 2023-12-21 ENCOUNTER — APPOINTMENT (OUTPATIENT)
Dept: ORTHOPEDIC SURGERY | Facility: CLINIC | Age: 68
End: 2023-12-21
Payer: MEDICARE

## 2023-12-21 VITALS — HEIGHT: 70 IN | BODY MASS INDEX: 44.38 KG/M2 | WEIGHT: 310 LBS

## 2023-12-21 DIAGNOSIS — M25.531 PAIN IN RIGHT WRIST: ICD-10-CM

## 2023-12-21 DIAGNOSIS — M25.532 PAIN IN RIGHT WRIST: ICD-10-CM

## 2023-12-21 PROCEDURE — 99214 OFFICE O/P EST MOD 30 MIN: CPT | Mod: 25

## 2023-12-21 PROCEDURE — 20605 DRAIN/INJ JOINT/BURSA W/O US: CPT | Mod: 50

## 2023-12-21 PROCEDURE — 72100 X-RAY EXAM L-S SPINE 2/3 VWS: CPT

## 2023-12-21 PROCEDURE — 73110 X-RAY EXAM OF WRIST: CPT | Mod: 50

## 2023-12-21 PROCEDURE — 72170 X-RAY EXAM OF PELVIS: CPT

## 2023-12-21 RX ORDER — METHYLPREDNISOLONE 4 MG/1
4 TABLET ORAL
Qty: 1 | Refills: 1 | Status: ACTIVE | COMMUNITY
Start: 2023-12-21 | End: 1900-01-01

## 2023-12-21 NOTE — PHYSICAL EXAM
[Flexion] : flexion [Bending to right] : bending to right [Absent] : achilles reflex absent [Bilateral] : hand bilaterally [Volar Wrist] : volar wrist [] : positive Phalen's

## 2023-12-21 NOTE — ASSESSMENT
[FreeTextEntry1] : will administer csi to both wrists carpal tunnel  start on medrol and robaxin and therapy for the lumbar issues

## 2023-12-21 NOTE — IMAGING
[Bilateral] : wrists bilaterally [There are no fractures, subluxations or dislocations. No significant abnormalities are seen] : There are no fractures, subluxations or dislocations. No significant abnormalities are seen [FreeTextEntry1] : narrowing 5-1 and 2-3 with anterior osteophyte formation [de-identified] : normal

## 2023-12-21 NOTE — HISTORY OF PRESENT ILLNESS
[Lower back] : lower back [Dull/Aching] : dull/aching [Sharp] : sharp [Tingling] : tingling [Frequent] : frequent [Sleep] : sleep [Sitting] : sitting [Lying in bed] : lying in bed [de-identified] : Has pain in wrists, numbness in fingers mansoor at night. Symptoms have been awakening him at night. Has low back pain past 4 weeks, no one particular injury. Bothering him mansoor with walking, bending. No actual pain down legs [FreeTextEntry1] : b/l wrists  [FreeTextEntry5] : no injury, patient has been dealing with increasing pain. right wrist is worse

## 2023-12-21 NOTE — PROCEDURE
[Carpal Tunnel] : carpal tunnel [Bilateral] : bilaterally of the [Pain] : pain [Inflammation] : inflammation [Alcohol] : alcohol [Betadine] : betadine [Ethyl Chloride sprayed topically] : ethyl chloride sprayed topically [Sterile technique used] : sterile technique used [___ cc    6mg] :  Betamethasone (Celestone) ~Vcc of 6mg [___ cc    0.5%] : Bupivacaine (Marcaine) ~Vcc of 0.5%  [] : Patient tolerated procedure well [Call if redness, pain or fever occur] : call if redness, pain or fever occur [Apply ice for 15min out of every hour for the next 12-24 hours as tolerated] : apply ice for 15 minutes out of every hour for the next 12-24 hours as tolerated [Patient was advised to rest the joint(s) for ____ days] : patient was advised to rest the joint(s) for [unfilled] days [Previous OTC use and PT nontherapeutic] : patient has tried OTC's including aspirin, Ibuprofen, Aleve, etc or prescription NSAIDS, and/or exercises at home and/or physical therapy without satisfactory response [Patient had decreased mobility in the joint] : patient had decreased mobility in the joint [Risks, benefits, alternatives discussed / Verbal consent obtained] : the risks benefits, and alternatives have been discussed, and verbal consent was obtained

## 2024-01-14 ENCOUNTER — RESULT CHARGE (OUTPATIENT)
Age: 69
End: 2024-01-14

## 2024-01-14 ENCOUNTER — APPOINTMENT (OUTPATIENT)
Dept: ORTHOPEDIC SURGERY | Facility: CLINIC | Age: 69
End: 2024-01-14
Payer: MEDICARE

## 2024-01-14 DIAGNOSIS — S63.633A SPRAIN OF INTERPHALANGEAL JOINT OF LEFT MIDDLE FINGER, INITIAL ENCOUNTER: ICD-10-CM

## 2024-01-14 DIAGNOSIS — M19.042 PRIMARY OSTEOARTHRITIS, LEFT HAND: ICD-10-CM

## 2024-01-14 PROCEDURE — 73130 X-RAY EXAM OF HAND: CPT | Mod: LT

## 2024-01-14 PROCEDURE — 29280 STRAPPING OF HAND OR FINGER: CPT | Mod: LT

## 2024-01-14 PROCEDURE — 99213 OFFICE O/P EST LOW 20 MIN: CPT | Mod: 25

## 2024-01-14 RX ORDER — IBUPROFEN 800 MG/1
800 TABLET, FILM COATED ORAL 3 TIMES DAILY
Qty: 90 | Refills: 1 | Status: ACTIVE | COMMUNITY
Start: 2024-01-14 | End: 1900-01-01

## 2024-01-14 NOTE — IMAGING
[de-identified] : left hand with no skin changes or bony deformities. left middle finger with ttp over the PIP joint with pain on stress of the RCL with no ligamentous laxity. mild stiffness noted to this digit. There is no triggering or ttp over the A1 pulley.   and intrinsic strength is 5/5 all digits are nvi.  Left finger xray  3 view shows mild OA with well corticated bone cyst to the base of P2.  there is no acute pathology noted

## 2024-01-14 NOTE — HISTORY OF PRESENT ILLNESS
[6] : 6 [Sharp] : sharp [de-identified] : 1/14/2024: Patient is a LHD 68 year old male here for his left middle finger.  States he was pulling a drawer when he got a sharp pain.  pmh: htn, hypothyroid allergies: nkda [FreeTextEntry1] : left middle finger

## 2024-01-14 NOTE — ASSESSMENT
[FreeTextEntry1] : The patient was advised of the diagnosis. The natural history of the pathology was explained in full to the patient in layman's terms. All questions were answered. The risks and benefits of surgical and non-surgical treatment alternatives were explained in full to the patient.  pt recommended and provided horacio loop to middle and index fingers for RCL sprain x 4 weeks. pt will rto in 2-3 weeks with hand specialist for f/u care. Ibuprofen 800 mg tid x 10 days provided.

## 2024-01-29 ENCOUNTER — APPOINTMENT (OUTPATIENT)
Dept: ORTHOPEDIC SURGERY | Facility: CLINIC | Age: 69
End: 2024-01-29
Payer: MEDICARE

## 2024-01-29 VITALS — HEIGHT: 70 IN | BODY MASS INDEX: 44.38 KG/M2 | WEIGHT: 310 LBS

## 2024-01-29 DIAGNOSIS — M54.50 LOW BACK PAIN, UNSPECIFIED: ICD-10-CM

## 2024-01-29 DIAGNOSIS — M51.36 OTHER INTERVERTEBRAL DISC DEGENERATION, LUMBAR REGION: ICD-10-CM

## 2024-01-29 DIAGNOSIS — G56.03 CARPAL TUNNEL SYNDROM,BILATERAL UPPER LIMBS: ICD-10-CM

## 2024-01-29 PROCEDURE — 99213 OFFICE O/P EST LOW 20 MIN: CPT

## 2024-01-29 NOTE — HISTORY OF PRESENT ILLNESS
[de-identified] : Has pain in wrists, numbness in fingers mansoor at night. Symptoms have been awakening him at night. Has low back pain past 4 weeks, no one particular injury. Bothering him mansoor with walking, bending. No actual pain down legs [Lower back] : lower back [Dull/Aching] : dull/aching [Sharp] : sharp [Tingling] : tingling [Frequent] : frequent [Sleep] : sleep [Sitting] : sitting [Lying in bed] : lying in bed [FreeTextEntry1] : b/l wrists  [FreeTextEntry5] : no injury, patient has been dealing with increasing pain. right wrist is worse

## 2024-01-29 NOTE — PHYSICAL EXAM
[Absent] : achilles reflex absent [FreeTextEntry8] : improved [Bilateral] : hand bilaterally [Volar Wrist] : volar wrist [] : positive Phalen's [de-identified] : improved [de-identified] : improved

## 2024-02-06 ENCOUNTER — NON-APPOINTMENT (OUTPATIENT)
Age: 69
End: 2024-02-06

## 2024-02-06 ENCOUNTER — APPOINTMENT (OUTPATIENT)
Dept: ELECTROPHYSIOLOGY | Facility: CLINIC | Age: 69
End: 2024-02-06
Payer: MEDICARE

## 2024-02-06 NOTE — ASU DISCHARGE PLAN (ADULT/PEDIATRIC) - CARE PROVIDER_API CALL
Elijah Bello)  Cardiac Electrophysiology; Cardiology  [address] [place] [address]  Phone: [phone]  Fax: [phone]  Scheduled Appointment: 05/09/2022 08:40 AM  
Awake/Alert

## 2024-02-07 PROCEDURE — 93296 REM INTERROG EVL PM/IDS: CPT

## 2024-02-07 PROCEDURE — 93294 REM INTERROG EVL PM/LDLS PM: CPT

## 2024-03-09 ENCOUNTER — RX RENEWAL (OUTPATIENT)
Age: 69
End: 2024-03-09

## 2024-03-14 ENCOUNTER — APPOINTMENT (OUTPATIENT)
Dept: ORTHOPEDIC SURGERY | Facility: CLINIC | Age: 69
End: 2024-03-14
Payer: MEDICARE

## 2024-03-14 DIAGNOSIS — S73.101A UNSPECIFIED SPRAIN OF RIGHT HIP, INITIAL ENCOUNTER: ICD-10-CM

## 2024-03-14 PROCEDURE — 73502 X-RAY EXAM HIP UNI 2-3 VIEWS: CPT

## 2024-03-14 PROCEDURE — 99213 OFFICE O/P EST LOW 20 MIN: CPT

## 2024-03-14 NOTE — HISTORY OF PRESENT ILLNESS
[de-identified] : 68 year old male presents today with one week history or right groin pain. Patient reports symptoms started after internal rotation of RLE. Patient reports he also is experiencing right buttock pain. Has pain with ambulating, when supine. Taking robaxin and NSAIDs prn with minimal relief.   PmHx: pacemaker, HTN, HLD, obesity, RBBB, hypothyroid NKDA Occupation: Retired     [FreeTextEntry1] : Rt groin [FreeTextEntry5] : RT groin

## 2024-04-12 ENCOUNTER — APPOINTMENT (OUTPATIENT)
Dept: ORTHOPEDIC SURGERY | Facility: CLINIC | Age: 69
End: 2024-04-12
Payer: MEDICARE

## 2024-04-12 VITALS — BODY MASS INDEX: 44.38 KG/M2 | HEIGHT: 70 IN | WEIGHT: 310 LBS

## 2024-04-12 DIAGNOSIS — M17.12 UNILATERAL PRIMARY OSTEOARTHRITIS, LEFT KNEE: ICD-10-CM

## 2024-04-12 PROCEDURE — J3490M: CUSTOM | Mod: NC

## 2024-04-12 PROCEDURE — 99204 OFFICE O/P NEW MOD 45 MIN: CPT | Mod: 25

## 2024-04-12 PROCEDURE — 73564 X-RAY EXAM KNEE 4 OR MORE: CPT | Mod: LT

## 2024-04-12 PROCEDURE — 20611 DRAIN/INJ JOINT/BURSA W/US: CPT | Mod: LT

## 2024-04-12 NOTE — IMAGING
[Left] : left knee [All Views] : anteroposterior, lateral, skyline, and anteroposterior standing [Moderate tricompartmental OA medial narrowing] : Moderate tricompartmental OA medial narrowing [Moderate tricompartmental OA lateral narrowing] : Moderate tricompartmental OA lateral narrowing [Moderate patellofemoral OA] : Moderate patellofemoral OA

## 2024-04-12 NOTE — DISCUSSION/SUMMARY
[de-identified] : 68yo M with left knee OA  1) CSI left knee today - tolerated well  2) cryotherapy, rest and activity modification 3) discussed availability of gel injections  4) rtc prn

## 2024-04-12 NOTE — HISTORY OF PRESENT ILLNESS
[Dull/Aching] : dull/aching [Localized] : localized [Constant] : constant [Physical therapy] : physical therapy [Exercising] : exercising [Retired] : Work status: retired [8] : 8 [Household chores] : household chores [Leisure] : leisure [de-identified] : 04/12/2024 Mr. DAYA ANDERSON, a 69 year old male, presents today for left knee pain after getting up from a sitting position. Patient has limited rom and feels a knot in the front of knee.

## 2024-04-12 NOTE — PHYSICAL EXAM
[Left] : left knee [NL (0)] : extension 0 degrees [4___] : hamstring 4[unfilled]/5 [Negative] : negative Juan Jose's [] : no pain with varus stress [TWNoteComboBox7] : flexion 130 degrees

## 2024-04-21 ENCOUNTER — RX RENEWAL (OUTPATIENT)
Age: 69
End: 2024-04-21

## 2024-04-21 RX ORDER — METHOCARBAMOL 750 MG/1
750 TABLET, FILM COATED ORAL
Qty: 60 | Refills: 0 | Status: ACTIVE | COMMUNITY
Start: 2023-12-21 | End: 1900-01-01

## 2024-04-22 ENCOUNTER — RX RENEWAL (OUTPATIENT)
Age: 69
End: 2024-04-22

## 2024-04-25 ENCOUNTER — APPOINTMENT (OUTPATIENT)
Dept: ORTHOPEDIC SURGERY | Facility: CLINIC | Age: 69
End: 2024-04-25

## 2024-05-07 ENCOUNTER — NON-APPOINTMENT (OUTPATIENT)
Age: 69
End: 2024-05-07

## 2024-05-07 ENCOUNTER — APPOINTMENT (OUTPATIENT)
Dept: ELECTROPHYSIOLOGY | Facility: CLINIC | Age: 69
End: 2024-05-07
Payer: MEDICARE

## 2024-05-07 PROCEDURE — 93294 REM INTERROG EVL PM/LDLS PM: CPT

## 2024-05-07 PROCEDURE — 93296 REM INTERROG EVL PM/IDS: CPT

## 2024-06-10 ENCOUNTER — RX RENEWAL (OUTPATIENT)
Age: 69
End: 2024-06-10

## 2024-06-10 RX ORDER — DICLOFENAC SODIUM 75 MG/1
75 TABLET, DELAYED RELEASE ORAL
Qty: 60 | Refills: 1 | Status: ACTIVE | COMMUNITY
Start: 2024-03-14 | End: 1900-01-01

## 2024-06-27 NOTE — ASU PATIENT PROFILE, ADULT - SURGICAL SITE INCISION
Caller: Jory Taylor    Relationship: Self    Best call back number: [phone]     What is the best time to reach you: ANYTIME     Who are you requesting to speak with (clinical staff, provider,  specific staff member): PARMINDER     Do you know the name of the person who called: PARMINDER     What was the call regarding: PT REPORTS THAT THEY WERE GIVEN A CALL  BUT THERE IS NO DOCUMENTATION IN THE CHART ABOUT THIS. IF THIS WAS NOT A MISTAKE, PLEASE CALL PT BACK.     STATES THIS WAS ABOUT LABS     Is it okay if the provider responds through MyChart: CALL   
----- Message from Satnam Guillen sent at 6/27/2024  9:29 AM EDT -----  Lipids are high, A!C is 6,6 consistent with diabetes, no changes, we told her to increase Crestor to 40 mg, discuss the lab with PCP  
Lipids are high, A!C is 6,6 consistent with diabetes, no changes, we told her to increase Crestor to 40 mg, discuss the lab with PCP per dr Guillen spoke with pt and she understood messages .    
Please look at pt lipids . Thank you  
no

## 2024-08-02 ENCOUNTER — APPOINTMENT (OUTPATIENT)
Dept: ORTHOPEDIC SURGERY | Facility: CLINIC | Age: 69
End: 2024-08-02
Payer: MEDICARE

## 2024-08-02 VITALS — BODY MASS INDEX: 44.38 KG/M2 | WEIGHT: 310 LBS | HEIGHT: 70 IN

## 2024-08-02 DIAGNOSIS — M19.042 PRIMARY OSTEOARTHRITIS, LEFT HAND: ICD-10-CM

## 2024-08-02 PROCEDURE — 99213 OFFICE O/P EST LOW 20 MIN: CPT | Mod: 25

## 2024-08-02 PROCEDURE — 20600 DRAIN/INJ JOINT/BURSA W/O US: CPT | Mod: LT

## 2024-08-02 PROCEDURE — J3490M: CUSTOM | Mod: NC,JZ

## 2024-08-02 NOTE — HISTORY OF PRESENT ILLNESS
[de-identified] : L MF pain and swelling  Started 2 days ago [Sudden] : sudden [6] : 6 [Dull/Aching] : dull/aching [Sleep] : sleep [] : no [FreeTextEntry1] : L MF [FreeTextEntry5] : Woke up 8/1/24 and couldn't move LT middle finger, has little ROM today, has seen Dr. Chapin, denies any injury/trauma [de-identified] : Dr. Chapin

## 2024-08-02 NOTE — ASSESSMENT
[FreeTextEntry1] : L MF PIP small joint injection was performed because of pain and inflammation Anesthesia: ethyl chloride sprayed topically Celestone 6mg: An injection of Celestone 1cc Marcaine: An injection of Marcaine 0.5% 1cc   The risks, benefits, and alternatives to cortisone injection were explained in full to the patient. Risks outlined include but are not limited to infection, sepsis, bleeding, scarring, skin discoloration, temporary increase in pain, syncopal episode, failure to resolve symptoms, allergic reaction, symptom recurrence, and elevation of blood sugar in diabetics. Patient understood the risks. All questions were answered. After discussion of options, patient verbally consented to an injection. Sterile prep was done of the injection site. Patient tolerated the procedure well. Advised to ice the injection site this evening.

## 2024-08-02 NOTE — PHYSICAL EXAM
Chief Complaint   Patient presents with   • Hospital F/U     [facility] : 5/31-6/8    Here with her 2 daughters.     Daughter notes she is swollen on top of legs which is new.        HPI: This is a 82 year old female here for follow up office visit, hospital follow up, was hospitalized from 5/31 to 6/8 for partial small bowel obstruction, hx of remote status post gastric surgery with antrectomy and gastroduodenostomy in [place]roth-I configuration with patent anastomosis  -acutely developed abdominal pain, nausea, emesis 6/4/23  -CT abdomen/pelvis with newly dilated and fluid filled small bowel loops, no definite transition point but distal ileal loops are completely collapsed.  Partial small bowel obstruction resolved with conservative management.  Recommend colonoscopy outpatient.  Last colonoscopy May 26, 2017 -normal, internal hemorrhoids.   She is here today with her daughters.      She reports overall GI symptoms are improved.  Appetite is good. Carafate works well for heartburn.  Denies vomiting, occasional nausea.  Occasional abdominal pain.  She is taking Benefiber and Miralax for constipation and having daily bowel movements, denies fevers, chills, hematochezia.  She does have longer leg edema.  She does see cardiology and has a message to them.          Medications:  Current Outpatient Medications   Medication Sig   • CALCIUM PO Take 1 tablet by mouth daily.   • Prenatal Vit-Fe Fumarate-FA (PRENATAL VITAMIN PO) Take 1 tablet by mouth daily.   • oxyCODONE HCl 10 MG immediate release tablet Take 1 tablet by mouth every 4 hours as needed (pain). Indications: Chronic Pain, moderate to severe   • torsemide (DEMADEX) 20 MG tablet Take 1 tablet by mouth daily. Take 20 mg daily   • primidone (MYSOLINE) 50 MG tablet Take 50 mg by mouth nightly.   • artificial tears Solution Place 1 drop into both eyes in the morning and 1 drop in the evening.   • ipratropium-albuterol (DUONEB) 0.5-2.5 (3) MG/3ML nebulizer solution Take 3  mLs by nebulization in the morning and 3 mLs at noon and 3 mLs in the evening. Indications: Chronic Bronchitis.   • levothyroxine 100 MCG tablet Take 1 tablet by mouth daily (before breakfast). 30-60 minutes before a meal with a full glass of water.   • LORazepam (ATIVAN) 0.5 MG tablet Take 1 tablet by mouth daily. Indications: Feeling Anxious   • hydroCORTisone (ANUSOL-HC) 2.5 % rectal cream Place 1 application. rectally 3 times daily as needed for Hemorrhoids.   • sucralfate (CARAFATE) 1 GM/10ML suspension Take 10 mLs by mouth in the morning and 10 mLs in the evening.   • budesonide (PULMICORT) 0.5 MG/2ML nebulizer suspension Take 2 mLs by nebulization in the morning and 2 mLs in the evening.   • potassium CHLORIDE (KLOR-CON M) 20 MEQ hung ER tablet 1 tablet BID   • mupirocin (BACTROBAN) 2 % ointment apply topically 3 times daily (Patient taking differently: Apply topically 3 times daily. As needed)   • allopurinol (ZYLOPRIM) 100 MG tablet Take 1 tablet by mouth 1 time daily   • divalproex (DEPAKOTE) 250 MG delayed release EC tablet Take 1 tablet by mouth 2 times daily   • metoPROLOL tartrate (LOPRESSOR) 25 MG tablet Take 1 tablet by mouth 2 times daily   • hydrALAZINE (APRESOLINE) 25 MG tablet Take 1 tablet by mouth 3 times daily   • spironolactone (ALDACTONE) 25 MG tablet Take 1/2 tablet by mouth 1 time daily   • Thiamine Mononitrate (vitamin B-1) 100 MG tablet Take 100 mg by mouth daily.   • donepezil (ARICEPT) 5 MG tablet Take 1 tablet by mouth nightly.   • montelukast (SINGULAIR) 10 MG tablet Take 1 tablet by mouth nightly.   • Pradaxa 150 MG Cap Take 1 capsule by mouth every 12 hours   • atorvastatin (LIPITOR) 40 MG tablet Take 1 tablet by mouth 1 time daily   • pantoprazole (PROTONIX) 40 MG tablet Take 1 tablet by mouth 2 times daily   • benzonatate (Tessalon Perles) 100 MG capsule Take 1 capsule by mouth 3 times daily as needed for Cough.   • NON FORMULARY Electrolytes in water   • triamcinolone  (ARISTOCORT) 0.1 % cream Apply topically 2 times daily.   • DULoxetine (CYMBALTA) 30 MG capsule Take 1 capsule by mouth 1 time daily (Patient taking differently: Takes with 60 mg capsule for 90 mg total daily)   • hyoscyamine (LEVSIN SL) 0.125 MG sublingual tablet Place 1 tablet under the tongue every 6 hours as needed for Cramping.   • DULoxetine (CYMBALTA) 60 MG capsule Take 1 capsule by mouth 1 time daily (Patient taking differently: Takes with 30 mg capsule for 90 mg total daily)   • prochlorperazine (COMPAZINE) 10 MG tablet Take 1 tablet by mouth every 8 hours as needed for Nausea.   • albuterol 108 (90 Base) MCG/ACT inhaler Inhale 2 puffs into the lungs every 4 hours as needed for Shortness of Breath or Wheezing.   • naLOXone (NARCAN) 4 MG/0.1ML nasal spray Spray the content of 1 device into 1 nostril. Call 911. May repeat with 2nd device in alternate nostril if no response in 2-3 minutes.   • polyethylene glycol (MIRALAX) 17 g packet Take 17 g by mouth daily. Indications: Constipation Stir and dissolve powder in any 4 to 8 ounces of beverage, then drink.   • diclofenac (VOLTAREN) 1 % gel Indications: Joint Damage causing Pain and Loss of Function Apply quarter-sized amount to right knee as needed.    • Cholecalciferol (VITAMIN D3) 50 mcg (2,000 units) tablet Take 1 tablet by mouth daily.   • fluticasone (FLONASE) 50 MCG/ACT nasal spray Spray 1 spray in each nostril daily as needed (prn).     No current facility-administered medications for this visit.       Allergies:  Allergies as of 07/05/2023 - Reviewed 07/05/2023   Allergen Reaction Noted   • Digoxin Other (See Comments) 02/09/2021   • Penicillins RASH 07/01/2013   • Trazodone Other (See Comments) 03/16/2005   • Trileptal Other (See Comments) 08/12/2020   • Zoloft Other (See Comments) 03/16/2005   • Gabapentin Other (See Comments) 04/04/2022       History:  Social History     Tobacco Use   Smoking Status Never   • Passive exposure: Yes   Smokeless Tobacco  Never   Tobacco Comments     smoked       Review of systems:  Constitutional: Denies fevers or chills.  GI: Denies nausea, vomiting, abdominal pain, diarrhea, constipation, bloody stools.    Objective:  Visit Vitals  /82   Pulse 80   Ht 5' 3\" (1.6 m)   Wt 70.4 kg (155 lb 3.2 oz) Comment: per daughter, pt was weighed today   BMI 27.49 kg/m²     Alert, appropriate, in a wheelchair, cooperative and in no acute distress.  Cardiac: S1S2, regular rate and rhythm.  No murmurs, rubs or gallops.  Abdomen: Active bowel sounds, soft, non distended. Generalized mild tenderness.  No hepatomegaly, masses, guarding or rebound.    Assessment:  1.  GERD  2.  Constipation       Plan:  1.  Did discuss with patient and family proceeding with colonoscopy, however; symptoms are resolved.  We will hold off due to age and co morbidities, we discussed red flags that would warrant further testing.    2.  Continue current medications.  Follow up as needed. The patient indicates understanding of these issues and agrees with the plan.      Collaborating physician:  FRANCIS Torres    [de-identified] : L MF  Swelling Nontender A1  Tender PIP No erythema Stiffness No trigger  Xrays OA

## 2024-08-13 ENCOUNTER — NON-APPOINTMENT (OUTPATIENT)
Age: 69
End: 2024-08-13

## 2024-08-13 ENCOUNTER — APPOINTMENT (OUTPATIENT)
Dept: ELECTROPHYSIOLOGY | Facility: CLINIC | Age: 69
End: 2024-08-13
Payer: MEDICARE

## 2024-08-13 VITALS
DIASTOLIC BLOOD PRESSURE: 71 MMHG | SYSTOLIC BLOOD PRESSURE: 105 MMHG | BODY MASS INDEX: 43.05 KG/M2 | OXYGEN SATURATION: 95 % | WEIGHT: 300 LBS | HEART RATE: 70 BPM

## 2024-08-13 DIAGNOSIS — I44.30 UNSPECIFIED ATRIOVENTRICULAR BLOCK: ICD-10-CM

## 2024-08-13 PROCEDURE — 93280 PM DEVICE PROGR EVAL DUAL: CPT

## 2024-08-13 PROCEDURE — 99213 OFFICE O/P EST LOW 20 MIN: CPT

## 2024-08-18 NOTE — END OF VISIT
[FreeTextEntry3] : interrogated, seen and examined with ACP.  I agree with H and P, A and P. Normal device function with adequate safety margins. ECG with evidence of LB capture.

## 2024-08-18 NOTE — DISCUSSION/SUMMARY
[FreeTextEntry1] : 69-year-old gentleman with a history of hypertension, hyperlipidemia, obesity, baseline right bundle branch block with left axis, syncope with subsequent "spacing out" episodes and event monitor showing multiple long pauses up to 6.4 seconds during wake hours. He is now s/p PPM (Medtronic [place] 4/25/2022- LB lead).  Interrogation revealed normal device function with stable lead measurements.  Stored data with a few episodes of NSVT, of which patient remains asymptomatic.  Continue atenolol.  Approximately 10.5 years of battery remaining.  He will continue with remote monitoring; follow up in 1 year.

## 2024-08-18 NOTE — HISTORY OF PRESENT ILLNESS
[None] : The patient complains of no symptoms [de-identified] : Mr. Asael Brandt is a 69-year-old gentleman with a history of hypertension, hyperlipidemia, obesity, baseline right bundle branch block with left axis, syncope with subsequent "spacing out" episodes with an event monitor showing multiple long pauses up to 6.4 seconds during waking hours, now status post dual-chamber pacemaker implantation on April 25, 2022.    Overall, he has been feeling well and denies any chest pain, palpitations, dyspnea, lightheadedness/dizziness, near syncope, or syncope.  Denies any device complaints.

## 2024-08-18 NOTE — PROCEDURE
[No] : not [See Device Printout] : See device printout [Pacemaker] : pacemaker [DDD] : DDD [Threshold Testing Performed] : Threshold testing was performed [NSR] : normal sinus rhythm [Lead Imp:  ___ohms] : lead impedance was [unfilled] ohms [Sensing Amplitude ___mv] : sensing amplitude was [unfilled] mv [___V @] : [unfilled] V [___ ms] : [unfilled] ms [None] : none [de-identified] : 70s [de-identified] : Medtronic [de-identified] : Tashia APONTE DR MRI [de-identified] : [id number] [de-identified] : 4/25/2022 [de-identified] : 58 [de-identified] : 10.5 years [de-identified] : AP 21.8%,  98.4%

## 2024-08-18 NOTE — PHYSICAL EXAM
[General Appearance - Well Developed] : well developed [Normal Appearance] : normal appearance [Well Groomed] : well groomed [General Appearance - Well Nourished] : well nourished [No Deformities] : no deformities [General Appearance - In No Acute Distress] : no acute distress [Heart Rate And Rhythm] : heart rate and rhythm were normal [Heart Sounds] : normal S1 and S2 [Murmurs] : no murmurs present [] : no respiratory distress [Respiration, Rhythm And Depth] : normal respiratory rhythm and effort [Exaggerated Use Of Accessory Muscles For Inspiration] : no accessory muscle use [Auscultation Breath Sounds / Voice Sounds] : lungs were clear to auscultation bilaterally [Left Infraclavicular] : left infraclavicular area [Well-Healed] : well-healed [FreeTextEntry1] : Varicose veins

## 2024-08-18 NOTE — HISTORY OF PRESENT ILLNESS
[None] : The patient complains of no symptoms [de-identified] : Mr. Asael Brandt is a 69-year-old gentleman with a history of hypertension, hyperlipidemia, obesity, baseline right bundle branch block with left axis, syncope with subsequent "spacing out" episodes with an event monitor showing multiple long pauses up to 6.4 seconds during waking hours, now status post dual-chamber pacemaker implantation on April 25, 2022.    Overall, he has been feeling well and denies any chest pain, palpitations, dyspnea, lightheadedness/dizziness, near syncope, or syncope.  Denies any device complaints.

## 2024-08-18 NOTE — PROCEDURE
[No] : not [See Device Printout] : See device printout [Pacemaker] : pacemaker [DDD] : DDD [Threshold Testing Performed] : Threshold testing was performed [NSR] : normal sinus rhythm [Lead Imp:  ___ohms] : lead impedance was [unfilled] ohms [Sensing Amplitude ___mv] : sensing amplitude was [unfilled] mv [___V @] : [unfilled] V [___ ms] : [unfilled] ms [None] : none [de-identified] : 70s [de-identified] : Medtronic [de-identified] : Tashia APONTE DR MRI [de-identified] : [id number] [de-identified] : 4/25/2022 [de-identified] : 58 [de-identified] : 10.5 years [de-identified] : AP 21.8%,  98.4%

## 2024-08-18 NOTE — PHYSICAL EXAM
[General Appearance - Well Developed] : well developed [Normal Appearance] : normal appearance [Well Groomed] : well groomed [General Appearance - Well Nourished] : well nourished [No Deformities] : no deformities [Heart Rate And Rhythm] : heart rate and rhythm were normal [General Appearance - In No Acute Distress] : no acute distress [Heart Sounds] : normal S1 and S2 [Murmurs] : no murmurs present [] : no respiratory distress [Respiration, Rhythm And Depth] : normal respiratory rhythm and effort [Exaggerated Use Of Accessory Muscles For Inspiration] : no accessory muscle use [Auscultation Breath Sounds / Voice Sounds] : lungs were clear to auscultation bilaterally [Left Infraclavicular] : left infraclavicular area [Well-Healed] : well-healed [FreeTextEntry1] : Varicose veins

## 2024-08-18 NOTE — DISCUSSION/SUMMARY
[FreeTextEntry1] : 69-year-old gentleman with a history of hypertension, hyperlipidemia, obesity, baseline right bundle branch block with left axis, syncope with subsequent "spacing out" episodes and event monitor showing multiple long pauses up to 6.4 seconds during wake hours. He is now s/p PPM (Medtronic [place] 4/25/2022- LB lead).  Interrogation revealed normal device function with stable lead measurements.  Stored data with a few episodes of NSVT, of which patient remains asymptomatic.  Continue atenolol.  Approximately 10.5 years of battery remaining.  He will continue with remote monitoring; follow up in 1 year.

## 2024-08-18 NOTE — PROCEDURE
[No] : not [See Device Printout] : See device printout [Pacemaker] : pacemaker [DDD] : DDD [Threshold Testing Performed] : Threshold testing was performed [NSR] : normal sinus rhythm [Lead Imp:  ___ohms] : lead impedance was [unfilled] ohms [Sensing Amplitude ___mv] : sensing amplitude was [unfilled] mv [___V @] : [unfilled] V [___ ms] : [unfilled] ms [None] : none [de-identified] : 70s [de-identified] : Medtronic [de-identified] : Tashia APONTE DR MRI [de-identified] : [id number] [de-identified] : 4/25/2022 [de-identified] : 58 [de-identified] : 10.5 years [de-identified] : AP 21.8%,  98.4%

## 2024-08-18 NOTE — DISCUSSION/SUMMARY
[FreeTextEntry1] : 69-year-old gentleman with a history of hypertension, hyperlipidemia, obesity, baseline right bundle branch block with left axis, syncope with subsequent "spacing out" episodes and event monitor showing multiple long pauses up to 6.4 seconds during wake hours. He is now s/p PPM (Medtronic [place] 4/25/2022- LB lead).  Interrogation revealed normal device function with stable lead measurements.  Stored data with a few episodes of NSVT, of which patient remains asymptomatic.  Continue atenolol.  Approximately 10.5 years of battery remaining.  He will continue with remote monitoring; follow up in 1 year.

## 2024-08-18 NOTE — PROCEDURE
[No] : not [See Device Printout] : See device printout [Pacemaker] : pacemaker [DDD] : DDD [Threshold Testing Performed] : Threshold testing was performed [NSR] : normal sinus rhythm [Lead Imp:  ___ohms] : lead impedance was [unfilled] ohms [Sensing Amplitude ___mv] : sensing amplitude was [unfilled] mv [___V @] : [unfilled] V [___ ms] : [unfilled] ms [None] : none [de-identified] : 70s [de-identified] : Medtronic [de-identified] : Tashia APONTE DR MRI [de-identified] : [id number] [de-identified] : 4/25/2022 [de-identified] : 58 [de-identified] : 10.5 years [de-identified] : AP 21.8%,  98.4%

## 2024-08-18 NOTE — HISTORY OF PRESENT ILLNESS
[None] : The patient complains of no symptoms [de-identified] : Mr. Asael Brandt is a 69-year-old gentleman with a history of hypertension, hyperlipidemia, obesity, baseline right bundle branch block with left axis, syncope with subsequent "spacing out" episodes with an event monitor showing multiple long pauses up to 6.4 seconds during waking hours, now status post dual-chamber pacemaker implantation on April 25, 2022.    Overall, he has been feeling well and denies any chest pain, palpitations, dyspnea, lightheadedness/dizziness, near syncope, or syncope.  Denies any device complaints.

## 2024-08-18 NOTE — CARDIOLOGY SUMMARY
[___] : [unfilled] [TextEntry] : 2 week MCOT monitor (3/17-3/31/22):  - SR between 25-126bpm with average HR 67bpm - 1 NSVT: 5 beats at 102bpm - 1 episode of AT: 3 beats - 84 pauses up to 6.4 seconds - episode of nocturnal 2:1 AVB  12/21/2020 TTE: EF 60-65%.  Moderate concentric LVH.  Mild MR/TR.

## 2024-08-18 NOTE — REASON FOR VISIT
[Follow-up Device Check] : is here today for a follow-up device check visit for [Arrhythmias (seen on stored data)] : arrhythmias seen on stored data [Spouse] : spouse [TextEntry] : Cardiologist: Geovany Hodges MD Vascular Surgeon: Eduar Collins MD

## 2024-08-18 NOTE — DISCUSSION/SUMMARY
[FreeTextEntry1] : 69-year-old gentleman with a history of hypertension, hyperlipidemia, obesity, baseline right bundle branch block with left axis, syncope with subsequent "spacing out" episodes and event monitor showing multiple long pauses up to 6.4 seconds during wake hours. He is now s/p PPM (Medtronic [place] 4/25/2022- LB lead).  Interrogation revealed normal device function with stable lead measurements.  Stored data with a few episodes of NSVT, of which patient remains asymptomatic.  Continue atenolol.  Approximately 10.5 years of battery remaining.  He will continue with remote monitoring; follow up in 1 year.

## 2024-10-03 ENCOUNTER — APPOINTMENT (OUTPATIENT)
Dept: ORTHOPEDIC SURGERY | Facility: CLINIC | Age: 69
End: 2024-10-03

## 2024-10-03 ENCOUNTER — APPOINTMENT (OUTPATIENT)
Dept: CT IMAGING | Facility: CLINIC | Age: 69
End: 2024-10-03
Payer: MEDICARE

## 2024-10-03 VITALS — BODY MASS INDEX: 42.95 KG/M2 | WEIGHT: 300 LBS | HEIGHT: 70 IN

## 2024-10-03 DIAGNOSIS — S90.30XA CONTUSION OF UNSPECIFIED FOOT, INITIAL ENCOUNTER: ICD-10-CM

## 2024-10-03 PROCEDURE — 73630 X-RAY EXAM OF FOOT: CPT | Mod: LT

## 2024-10-03 PROCEDURE — L4361: CPT | Mod: KX,LT

## 2024-10-03 PROCEDURE — 73700 CT LOWER EXTREMITY W/O DYE: CPT | Mod: LT,MH

## 2024-10-03 PROCEDURE — 99204 OFFICE O/P NEW MOD 45 MIN: CPT

## 2024-10-03 PROCEDURE — 76376 3D RENDER W/INTRP POSTPROCES: CPT | Mod: LT

## 2024-10-03 RX ORDER — TRAMADOL HYDROCHLORIDE AND ACETAMINOPHEN 37.5; 325 MG/1; MG/1
37.5-325 TABLET, FILM COATED ORAL
Qty: 20 | Refills: 0 | Status: ACTIVE | COMMUNITY
Start: 2024-10-03 | End: 1900-01-01

## 2024-10-03 RX ORDER — METHYLPREDNISOLONE 4 MG/1
4 TABLET ORAL
Qty: 1 | Refills: 0 | Status: ACTIVE | COMMUNITY
Start: 2024-10-03 | End: 1900-01-01

## 2024-10-03 NOTE — HISTORY OF PRESENT ILLNESS
[de-identified] : Here for injury to the left foot on Tuesday when getting out of the car. Patient does not recall rolling the ankle or foot but had increased pain when going to stand. Pain on the top and bottom of the foot. Pain with ambulation, using cane but needed wheelchair to walk normally sees dr francis

## 2024-10-03 NOTE — DISCUSSION/SUMMARY
[de-identified] : receommend a camboot for ambuilation  and will send amedrol pack for pain willget a ct scan to rule out stress fx  vs corinna   will follow up  w dr cullen  last week

## 2024-10-03 NOTE — PHYSICAL EXAM
[Mild] : mild swelling of medial foot [NL (40)] : plantar flexion 40 degrees [NL 30)] : inversion 30 degrees [NL (20)] : eversion 20 degrees [5___] : [facility] 5[unfilled]/5 [2+] : posterior tibialis pulse: 2+ [Normal] : saphenous nerve sensation normal [] : patient ambulates without assistive device [Left] : left foot [There are no fractures, subluxations or dislocations. No significant abnormalities are seen] : There are no fractures, subluxations or dislocations. No significant abnormalities are seen

## 2024-10-09 ENCOUNTER — APPOINTMENT (OUTPATIENT)
Dept: ORTHOPEDIC SURGERY | Facility: CLINIC | Age: 69
End: 2024-10-09
Payer: MEDICARE

## 2024-10-09 VITALS — BODY MASS INDEX: 42.95 KG/M2 | HEIGHT: 70 IN | WEIGHT: 300 LBS

## 2024-10-09 PROBLEM — S93.602A FOOT SPRAIN, LEFT, INITIAL ENCOUNTER: Status: ACTIVE | Noted: 2024-10-09

## 2024-10-09 PROCEDURE — 73630 X-RAY EXAM OF FOOT: CPT | Mod: LT

## 2024-10-09 PROCEDURE — 99214 OFFICE O/P EST MOD 30 MIN: CPT

## 2024-10-14 ENCOUNTER — APPOINTMENT (OUTPATIENT)
Dept: ORTHOPEDIC SURGERY | Facility: CLINIC | Age: 69
End: 2024-10-14
Payer: MEDICARE

## 2024-10-14 DIAGNOSIS — S93.602A UNSPECIFIED SPRAIN OF LEFT FOOT, INITIAL ENCOUNTER: ICD-10-CM

## 2024-10-14 PROCEDURE — 73630 X-RAY EXAM OF FOOT: CPT | Mod: LT

## 2024-10-14 PROCEDURE — 99214 OFFICE O/P EST MOD 30 MIN: CPT

## 2024-10-14 RX ORDER — ACETAMINOPHEN 500 MG/1
500 TABLET, COATED ORAL
Qty: 60 | Refills: 0 | Status: ACTIVE | COMMUNITY
Start: 2024-10-14 | End: 1900-01-01

## 2024-10-14 RX ORDER — NAPROXEN 500 MG/1
500 TABLET ORAL
Qty: 42 | Refills: 0 | Status: ACTIVE | COMMUNITY
Start: 2024-10-14 | End: 1900-01-01

## 2024-10-18 DIAGNOSIS — Z95.0 PRESENCE OF CARDIAC PACEMAKER: ICD-10-CM

## 2024-10-19 PROBLEM — S93.602A FOOT SPRAIN, LEFT, INITIAL ENCOUNTER: Status: ACTIVE | Noted: 2024-10-19

## 2024-10-30 ENCOUNTER — APPOINTMENT (OUTPATIENT)
Dept: ORTHOPEDIC SURGERY | Facility: CLINIC | Age: 69
End: 2024-10-30
Payer: MEDICARE

## 2024-10-30 ENCOUNTER — APPOINTMENT (OUTPATIENT)
Dept: ORTHOPEDIC SURGERY | Facility: CLINIC | Age: 69
End: 2024-10-30

## 2024-10-30 DIAGNOSIS — S93.602D UNSPECIFIED SPRAIN OF LEFT FOOT, SUBSEQUENT ENCOUNTER: ICD-10-CM

## 2024-10-30 PROCEDURE — 99214 OFFICE O/P EST MOD 30 MIN: CPT

## 2024-10-30 PROCEDURE — 73630 X-RAY EXAM OF FOOT: CPT | Mod: LT

## 2024-11-05 ENCOUNTER — APPOINTMENT (OUTPATIENT)
Dept: MRI IMAGING | Facility: HOSPITAL | Age: 69
End: 2024-11-05

## 2024-11-05 ENCOUNTER — OUTPATIENT (OUTPATIENT)
Dept: OUTPATIENT SERVICES | Facility: HOSPITAL | Age: 69
LOS: 1 days | End: 2024-11-05
Payer: MEDICARE

## 2024-11-05 ENCOUNTER — RESULT REVIEW (OUTPATIENT)
Age: 69
End: 2024-11-05

## 2024-11-05 DIAGNOSIS — Z98.890 OTHER SPECIFIED POSTPROCEDURAL STATES: Chronic | ICD-10-CM

## 2024-11-05 DIAGNOSIS — S93.602A UNSPECIFIED SPRAIN OF LEFT FOOT, INITIAL ENCOUNTER: ICD-10-CM

## 2024-11-05 DIAGNOSIS — Z98.84 BARIATRIC SURGERY STATUS: Chronic | ICD-10-CM

## 2024-11-05 PROCEDURE — 73718 MRI LOWER EXTREMITY W/O DYE: CPT | Mod: 26,LT,MA

## 2024-11-05 PROCEDURE — 71046 X-RAY EXAM CHEST 2 VIEWS: CPT | Mod: 26

## 2024-11-12 ENCOUNTER — NON-APPOINTMENT (OUTPATIENT)
Age: 69
End: 2024-11-12

## 2024-11-12 ENCOUNTER — APPOINTMENT (OUTPATIENT)
Dept: ELECTROPHYSIOLOGY | Facility: CLINIC | Age: 69
End: 2024-11-12
Payer: MEDICARE

## 2024-11-12 PROCEDURE — 93294 REM INTERROG EVL PM/LDLS PM: CPT

## 2024-11-12 PROCEDURE — 93296 REM INTERROG EVL PM/IDS: CPT

## 2024-11-14 ENCOUNTER — APPOINTMENT (OUTPATIENT)
Dept: ORTHOPEDIC SURGERY | Facility: CLINIC | Age: 69
End: 2024-11-14
Payer: MEDICARE

## 2024-11-14 DIAGNOSIS — S93.602D UNSPECIFIED SPRAIN OF LEFT FOOT, SUBSEQUENT ENCOUNTER: ICD-10-CM

## 2024-11-14 PROCEDURE — 99214 OFFICE O/P EST MOD 30 MIN: CPT

## 2024-11-20 PROCEDURE — 71046 X-RAY EXAM CHEST 2 VIEWS: CPT

## 2024-11-20 PROCEDURE — 73718 MRI LOWER EXTREMITY W/O DYE: CPT | Mod: MA

## 2025-02-11 ENCOUNTER — APPOINTMENT (OUTPATIENT)
Dept: ELECTROPHYSIOLOGY | Facility: CLINIC | Age: 70
End: 2025-02-11

## 2025-02-11 PROCEDURE — 93294 REM INTERROG EVL PM/LDLS PM: CPT

## 2025-02-11 PROCEDURE — 93296 REM INTERROG EVL PM/IDS: CPT

## 2025-02-21 ENCOUNTER — APPOINTMENT (OUTPATIENT)
Dept: ORTHOPEDIC SURGERY | Facility: CLINIC | Age: 70
End: 2025-02-21
Payer: MEDICARE

## 2025-02-21 DIAGNOSIS — M79.671 PAIN IN RIGHT FOOT: ICD-10-CM

## 2025-02-21 PROCEDURE — 73630 X-RAY EXAM OF FOOT: CPT | Mod: RT

## 2025-02-21 PROCEDURE — 99213 OFFICE O/P EST LOW 20 MIN: CPT

## 2025-05-13 ENCOUNTER — APPOINTMENT (OUTPATIENT)
Dept: ELECTROPHYSIOLOGY | Facility: CLINIC | Age: 70
End: 2025-05-13
Payer: MEDICARE

## 2025-05-13 ENCOUNTER — NON-APPOINTMENT (OUTPATIENT)
Age: 70
End: 2025-05-13

## 2025-05-13 PROCEDURE — 93296 REM INTERROG EVL PM/IDS: CPT

## 2025-05-13 PROCEDURE — 93294 REM INTERROG EVL PM/LDLS PM: CPT

## 2025-08-19 ENCOUNTER — APPOINTMENT (OUTPATIENT)
Dept: ELECTROPHYSIOLOGY | Facility: CLINIC | Age: 70
End: 2025-08-19
Payer: MEDICARE

## 2025-08-19 VITALS — SYSTOLIC BLOOD PRESSURE: 113 MMHG | OXYGEN SATURATION: 96 % | DIASTOLIC BLOOD PRESSURE: 76 MMHG | HEART RATE: 69 BPM

## 2025-08-19 DIAGNOSIS — R00.2 PALPITATIONS: ICD-10-CM

## 2025-08-19 DIAGNOSIS — I44.30 UNSPECIFIED ATRIOVENTRICULAR BLOCK: ICD-10-CM

## 2025-08-19 DIAGNOSIS — I47.29 OTHER VENTRICULAR TACHYCARDIA: ICD-10-CM

## 2025-08-19 PROCEDURE — 99213 OFFICE O/P EST LOW 20 MIN: CPT

## 2025-08-19 PROCEDURE — 93280 PM DEVICE PROGR EVAL DUAL: CPT

## 2025-08-19 RX ORDER — LEVOTHYROXINE SODIUM 200 UG/1
200 TABLET ORAL
Refills: 0 | Status: ACTIVE | COMMUNITY
Start: 2025-08-19